# Patient Record
Sex: FEMALE | Race: WHITE | NOT HISPANIC OR LATINO | Employment: OTHER | URBAN - METROPOLITAN AREA
[De-identification: names, ages, dates, MRNs, and addresses within clinical notes are randomized per-mention and may not be internally consistent; named-entity substitution may affect disease eponyms.]

---

## 2017-01-21 ENCOUNTER — OFFICE VISIT (OUTPATIENT)
Dept: FAMILY MEDICINE CLINIC | Facility: CLINIC | Age: 62
End: 2017-01-21

## 2017-01-21 VITALS
DIASTOLIC BLOOD PRESSURE: 62 MMHG | HEIGHT: 65 IN | WEIGHT: 143 LBS | OXYGEN SATURATION: 94 % | BODY MASS INDEX: 23.82 KG/M2 | SYSTOLIC BLOOD PRESSURE: 112 MMHG | HEART RATE: 89 BPM | TEMPERATURE: 98.4 F

## 2017-01-21 DIAGNOSIS — J01.80 OTHER ACUTE SINUSITIS, RECURRENCE NOT SPECIFIED: ICD-10-CM

## 2017-01-21 DIAGNOSIS — E11.9 TYPE 2 DIABETES MELLITUS WITHOUT COMPLICATION, WITHOUT LONG-TERM CURRENT USE OF INSULIN (HCC): Primary | ICD-10-CM

## 2017-01-21 DIAGNOSIS — Z13.9 SCREENING: ICD-10-CM

## 2017-01-21 DIAGNOSIS — F32.A DEPRESSION, UNSPECIFIED DEPRESSION TYPE: ICD-10-CM

## 2017-01-21 DIAGNOSIS — I10 ESSENTIAL HYPERTENSION: ICD-10-CM

## 2017-01-21 LAB
ALBUMIN SERPL-MCNC: 4.8 G/DL (ref 3.5–5.2)
ALBUMIN UR-MCNC: 20 MG/L (ref 0–20)
ALBUMIN/GLOB SERPL: 1.7 G/DL
ALP SERPL-CCNC: 83 U/L (ref 39–117)
ALT SERPL W P-5'-P-CCNC: 19 U/L (ref 1–33)
ANION GAP SERPL CALCULATED.3IONS-SCNC: 13.3 MMOL/L
AST SERPL-CCNC: 21 U/L (ref 1–32)
BILIRUB SERPL-MCNC: 0.3 MG/DL (ref 0.1–1.2)
BUN BLD-MCNC: 23 MG/DL (ref 6–20)
BUN/CREAT SERPL: 16.4 (ref 7–25)
CALCIUM SPEC-SCNC: 10.2 MG/DL (ref 8.6–10.5)
CHLORIDE SERPL-SCNC: 101 MMOL/L (ref 98–107)
CHOLEST SERPL-MCNC: 240 MG/DL (ref 0–200)
CO2 SERPL-SCNC: 25.7 MMOL/L (ref 22–29)
CREAT BLD-MCNC: 1.4 MG/DL (ref 0.57–1)
GFR SERPL CREATININE-BSD FRML MDRD: 38 ML/MIN/1.73
GLOBULIN UR ELPH-MCNC: 2.8 GM/DL
GLUCOSE BLD-MCNC: 122 MG/DL (ref 65–99)
HBA1C MFR BLD: 6.3 % (ref 4.8–5.6)
HCV AB SER DONR QL: NORMAL
HDLC SERPL-MCNC: 47 MG/DL (ref 40–60)
LDLC SERPL CALC-MCNC: 156 MG/DL (ref 0–100)
LDLC/HDLC SERPL: 3.32 {RATIO}
POTASSIUM BLD-SCNC: 4.7 MMOL/L (ref 3.5–5.2)
PROT SERPL-MCNC: 7.6 G/DL (ref 6–8.5)
SODIUM BLD-SCNC: 140 MMOL/L (ref 136–145)
TRIGL SERPL-MCNC: 184 MG/DL (ref 0–150)
VLDLC SERPL-MCNC: 36.8 MG/DL (ref 5–40)

## 2017-01-21 PROCEDURE — 99214 OFFICE O/P EST MOD 30 MIN: CPT | Performed by: FAMILY MEDICINE

## 2017-01-21 PROCEDURE — 83036 HEMOGLOBIN GLYCOSYLATED A1C: CPT | Performed by: FAMILY MEDICINE

## 2017-01-21 PROCEDURE — 80053 COMPREHEN METABOLIC PANEL: CPT | Performed by: FAMILY MEDICINE

## 2017-01-21 PROCEDURE — 82043 UR ALBUMIN QUANTITATIVE: CPT | Performed by: FAMILY MEDICINE

## 2017-01-21 PROCEDURE — 80061 LIPID PANEL: CPT | Performed by: FAMILY MEDICINE

## 2017-01-21 PROCEDURE — 86803 HEPATITIS C AB TEST: CPT | Performed by: FAMILY MEDICINE

## 2017-01-21 RX ORDER — SPIRONOLACTONE 100 MG/1
100 TABLET, FILM COATED ORAL DAILY
COMMUNITY
End: 2022-02-23 | Stop reason: SDUPTHER

## 2017-01-21 RX ORDER — AMOXICILLIN 875 MG/1
875 TABLET, COATED ORAL 2 TIMES DAILY
Qty: 20 TABLET | Refills: 0 | Status: SHIPPED | OUTPATIENT
Start: 2017-01-21 | End: 2021-04-07

## 2017-01-21 NOTE — PROGRESS NOTES
SUBJECTIVE:  The patient is a 61-year-old white female is here for follow-up.  She's overdue on her visit.  She states her blood sugars are running well.  She has a history of diabetes renal insufficiency depression.  She's had some nasal congestion and a painful right ear.  No fever or chills.    PAST MEDICAL HISTORY:  Reviewed.    REVIEW OF SYSTEMS:  Please see above; 14 point ROS otherwise negative.      OBJECTIVE: Vitals signs are reviewed and are stable.    HEENT: PERRLA.   Right tympanic membrane is dull.  Left TM looks okay.  Tenderness is present over frontal sinuses.  Neck:  Supple.    Lungs:  Clear.    Heart:  Regular rate and rhythm.    Abdomen:   Soft, nontender.    Extremities:  No cyanosis, clubbing or edema.      ASSESSMENT:     Type 2 diabetes  Renal insufficiency  Hypertension  Sinusitis  History of depression    PLAN:   CMP fasting lipid hemoglobin A1c urine for microalbuminuria.  Amoxicillin 875 twice a day.  Diet and exercise discussed.  See ophthalmologist for eye exam.  Recheck 2 days if no better.    Much of this encounter note is an electronic transcription/translation of spoken language to printed text.  The electronic translation of spoken language may permit erroneous, or at times, nonsensical words or phrases to be inadvertently transcribed.  Although I have reviewed the note for such errors, some may still exist.

## 2017-04-03 RX ORDER — SITAGLIPTIN 100 MG/1
TABLET, FILM COATED ORAL
Qty: 90 TABLET | Refills: 0 | Status: SHIPPED | OUTPATIENT
Start: 2017-04-03 | End: 2017-07-01 | Stop reason: SDUPTHER

## 2017-07-02 RX ORDER — SITAGLIPTIN 100 MG/1
TABLET, FILM COATED ORAL
Qty: 90 TABLET | Refills: 0 | Status: SHIPPED | OUTPATIENT
Start: 2017-07-02 | End: 2017-09-28 | Stop reason: SDUPTHER

## 2017-09-11 RX ORDER — OLMESARTAN MEDOXOMIL AND HYDROCHLOROTHIAZIDE 40/12.5 40; 12.5 MG/1; MG/1
TABLET ORAL
Qty: 90 TABLET | Refills: 0 | Status: SHIPPED | OUTPATIENT
Start: 2017-09-11 | End: 2017-10-11 | Stop reason: SDUPTHER

## 2017-09-28 RX ORDER — SITAGLIPTIN 100 MG/1
TABLET, FILM COATED ORAL
Qty: 90 TABLET | Refills: 0 | Status: SHIPPED | OUTPATIENT
Start: 2017-09-28 | End: 2017-12-25 | Stop reason: SDUPTHER

## 2017-10-11 ENCOUNTER — TELEPHONE (OUTPATIENT)
Dept: FAMILY MEDICINE CLINIC | Facility: CLINIC | Age: 62
End: 2017-10-11

## 2017-10-11 RX ORDER — OLMESARTAN MEDOXOMIL AND HYDROCHLOROTHIAZIDE 40/12.5 40; 12.5 MG/1; MG/1
1 TABLET ORAL DAILY
Qty: 90 TABLET | Refills: 0 | Status: SHIPPED | OUTPATIENT
Start: 2017-10-11 | End: 2017-11-08

## 2017-10-11 NOTE — TELEPHONE ENCOUNTER
PT NEEDS A WRITTEN SCRIPT FOR BENICAR HCT 40-12.5MG TABLETS SAYING NOT TO GIVE GENERIC.  SAYS TO JUST HAVE DR. DUBOIS BRING IT HOME, IS NEXT DOOR NEIGHBOR.

## 2017-10-12 ENCOUNTER — TELEPHONE (OUTPATIENT)
Dept: FAMILY MEDICINE CLINIC | Facility: CLINIC | Age: 62
End: 2017-10-12

## 2017-10-12 NOTE — TELEPHONE ENCOUNTER
PT NEEDS HER BENICAR SENT OT PHARMACY WITH THE [facility] ON IT SO SHE CAN GET IT FILLED AND DOESN'T HAVE TO PAY SO MUCH ON HER COPAY    [place]

## 2017-11-08 ENCOUNTER — TELEPHONE (OUTPATIENT)
Dept: FAMILY MEDICINE CLINIC | Facility: CLINIC | Age: 62
End: 2017-11-08

## 2017-11-08 DIAGNOSIS — I10 HYPERTENSION, UNSPECIFIED TYPE: Primary | ICD-10-CM

## 2017-11-08 RX ORDER — LOSARTAN POTASSIUM AND HYDROCHLOROTHIAZIDE 25; 100 MG/1; MG/1
1 TABLET ORAL DAILY
Qty: 30 TABLET | Refills: 1 | Status: SHIPPED | OUTPATIENT
Start: 2017-11-08 | End: 2017-11-08 | Stop reason: SDUPTHER

## 2017-11-08 RX ORDER — LOSARTAN POTASSIUM AND HYDROCHLOROTHIAZIDE 25; 100 MG/1; MG/1
TABLET ORAL
Qty: 90 TABLET | Refills: 1 | Status: SHIPPED | OUTPATIENT
Start: 2017-11-08 | End: 2018-04-03 | Stop reason: SINTOL

## 2017-11-08 NOTE — TELEPHONE ENCOUNTER
Med changed to Losartan since she had never tried anything else, order given for b/p machine to monitor b/p at home and patient advised to schedule apt.

## 2017-11-10 RX ORDER — OLMESARTAN/HYDROCHLOROTHIAZIDE 40-12.5 MG
TABLET ORAL
Qty: 90 TABLET | Refills: 0 | Status: SHIPPED | OUTPATIENT
Start: 2017-11-10 | End: 2021-04-07

## 2017-12-26 RX ORDER — SITAGLIPTIN 100 MG/1
TABLET, FILM COATED ORAL
Qty: 90 TABLET | Refills: 0 | Status: SHIPPED | OUTPATIENT
Start: 2017-12-26 | End: 2018-03-24 | Stop reason: SDUPTHER

## 2018-03-26 RX ORDER — SITAGLIPTIN 100 MG/1
TABLET, FILM COATED ORAL
Qty: 90 TABLET | Refills: 0 | Status: SHIPPED | OUTPATIENT
Start: 2018-03-26 | End: 2019-05-23 | Stop reason: SDUPTHER

## 2018-04-03 ENCOUNTER — RESULTS ENCOUNTER (OUTPATIENT)
Dept: FAMILY MEDICINE CLINIC | Facility: CLINIC | Age: 63
End: 2018-04-03

## 2018-04-03 ENCOUNTER — OFFICE VISIT (OUTPATIENT)
Dept: FAMILY MEDICINE CLINIC | Facility: CLINIC | Age: 63
End: 2018-04-03

## 2018-04-03 VITALS
HEART RATE: 92 BPM | DIASTOLIC BLOOD PRESSURE: 70 MMHG | RESPIRATION RATE: 18 BRPM | BODY MASS INDEX: 23.66 KG/M2 | WEIGHT: 142 LBS | HEIGHT: 65 IN | OXYGEN SATURATION: 97 % | SYSTOLIC BLOOD PRESSURE: 110 MMHG | TEMPERATURE: 97.7 F

## 2018-04-03 DIAGNOSIS — I10 ESSENTIAL HYPERTENSION: ICD-10-CM

## 2018-04-03 DIAGNOSIS — Z12.11 SCREENING FOR COLON CANCER: Primary | ICD-10-CM

## 2018-04-03 DIAGNOSIS — E11.9 TYPE 2 DIABETES MELLITUS WITHOUT COMPLICATION, WITHOUT LONG-TERM CURRENT USE OF INSULIN (HCC): ICD-10-CM

## 2018-04-03 DIAGNOSIS — F32.A DEPRESSION, UNSPECIFIED DEPRESSION TYPE: ICD-10-CM

## 2018-04-03 DIAGNOSIS — E78.5 HYPERLIPIDEMIA, UNSPECIFIED HYPERLIPIDEMIA TYPE: ICD-10-CM

## 2018-04-03 DIAGNOSIS — Z12.11 SCREENING FOR COLON CANCER: ICD-10-CM

## 2018-04-03 LAB — 25(OH)D3 SERPL-MCNC: 85.3 NG/ML (ref 30–100)

## 2018-04-03 PROCEDURE — 80061 LIPID PANEL: CPT | Performed by: FAMILY MEDICINE

## 2018-04-03 PROCEDURE — 99214 OFFICE O/P EST MOD 30 MIN: CPT | Performed by: FAMILY MEDICINE

## 2018-04-03 PROCEDURE — 83036 HEMOGLOBIN GLYCOSYLATED A1C: CPT | Performed by: FAMILY MEDICINE

## 2018-04-03 PROCEDURE — 80053 COMPREHEN METABOLIC PANEL: CPT | Performed by: FAMILY MEDICINE

## 2018-04-03 PROCEDURE — 82306 VITAMIN D 25 HYDROXY: CPT | Performed by: FAMILY MEDICINE

## 2018-04-03 NOTE — PROGRESS NOTES
SUBJECTIVE:  The patient is a 62-year-old white female is here for follow-up.  She is past due on routine follow-up for diabetes.  She states she's not been checking her blood sugars.  She does not want colonoscopy but will do the cologuard testing.  Physically she feels okay.    PAST MEDICAL HISTORY:  Reviewed.    REVIEW OF SYSTEMS:  Please see above; 14 point ROS otherwise negative.      OBJECTIVE: Vitals signs are reviewed and are stable.    HEENT: PERRLA.    Neck:  Supple.    Lungs:  Clear.    Heart:  Regular rate and rhythm.    Abdomen:   Soft, nontender.    Extremities:  No cyanosis, clubbing or edema.  Neurovascular status intact    ASSESSMENT:    Type 2 diabetes  Hypertension  Hyperlipidemia  Depression    PLAN:  I stressed the importance of more routine follow-up.  I recommended a GYN breast exam and mammogram.  I recommend an eye exam.  Monitor blood sugars.  CMP fasting lipid hemoglobin A1c and urine for microalbuminuria ordered.    Much of this encounter note is an electronic transcription/translation of spoken language to printed text.  The electronic translation of spoken language may permit erroneous, or at times, nonsensical words or phrases to be inadvertently transcribed.  Although I have reviewed the note for such errors, some may still exist.

## 2018-04-09 RX ORDER — OLMESARTAN/HYDROCHLOROTHIAZIDE 40-12.5 MG
TABLET ORAL
Qty: 90 TABLET | Refills: 0 | Status: SHIPPED | OUTPATIENT
Start: 2018-04-09 | End: 2019-02-11 | Stop reason: SDUPTHER

## 2018-06-20 RX ORDER — SITAGLIPTIN 100 MG/1
TABLET, FILM COATED ORAL
Qty: 90 TABLET | Refills: 0 | Status: SHIPPED | OUTPATIENT
Start: 2018-06-20 | End: 2018-09-12 | Stop reason: SDUPTHER

## 2018-07-09 RX ORDER — OLMESARTAN/HYDROCHLOROTHIAZIDE 40-12.5 MG
TABLET ORAL
Qty: 90 TABLET | Refills: 0 | Status: SHIPPED | OUTPATIENT
Start: 2018-07-09 | End: 2019-02-11 | Stop reason: SDUPTHER

## 2018-09-12 RX ORDER — SITAGLIPTIN 100 MG/1
TABLET, FILM COATED ORAL
Qty: 90 TABLET | Refills: 0 | Status: SHIPPED | OUTPATIENT
Start: 2018-09-12 | End: 2018-12-08 | Stop reason: SDUPTHER

## 2018-10-07 RX ORDER — OLMESARTAN/HYDROCHLOROTHIAZIDE 40-12.5 MG
TABLET ORAL
Qty: 90 TABLET | Refills: 0 | Status: SHIPPED | OUTPATIENT
Start: 2018-10-07 | End: 2019-02-11 | Stop reason: SDUPTHER

## 2018-12-08 RX ORDER — SITAGLIPTIN 100 MG/1
TABLET, FILM COATED ORAL
Qty: 90 TABLET | Refills: 0 | Status: SHIPPED | OUTPATIENT
Start: 2018-12-08 | End: 2019-02-11 | Stop reason: SDUPTHER

## 2019-01-04 LAB — SCAN RESULT: NORMAL

## 2019-01-04 RX ORDER — OLMESARTAN/HYDROCHLOROTHIAZIDE 40-12.5 MG
TABLET ORAL
Qty: 90 TABLET | Refills: 0 | Status: SHIPPED | OUTPATIENT
Start: 2019-01-04 | End: 2019-02-11 | Stop reason: SDUPTHER

## 2019-01-04 RX ORDER — VENLAFAXINE HYDROCHLORIDE 150 MG/1
150 CAPSULE, EXTENDED RELEASE ORAL DAILY
Qty: 90 CAPSULE | Refills: 3 | Status: SHIPPED | OUTPATIENT
Start: 2019-01-04 | End: 2019-06-12 | Stop reason: SDUPTHER

## 2019-01-09 ENCOUNTER — TELEPHONE (OUTPATIENT)
Dept: FAMILY MEDICINE CLINIC | Facility: CLINIC | Age: 64
End: 2019-01-09

## 2019-01-14 ENCOUNTER — TELEPHONE (OUTPATIENT)
Dept: FAMILY MEDICINE CLINIC | Facility: CLINIC | Age: 64
End: 2019-01-14

## 2019-01-14 NOTE — TELEPHONE ENCOUNTER
CALLED Friday AND IS CALLING AGAIN TO GET A PA on HER BENICAR HCT 40/12.5 MG AND NEEDS THAT DONE SO SHE CAN GET HER MEDICATION AS SHE ONLY HAS ONE PILL LEFT SHE IS DESPERATE AND REALLY NEEDS HER MEDICATION TODAY. PLEASE SHE SAID, SHE SAID SHE IS ALSO DR DUBOIS NEIGHBOR.

## 2019-02-11 ENCOUNTER — OFFICE VISIT (OUTPATIENT)
Dept: FAMILY MEDICINE CLINIC | Facility: CLINIC | Age: 64
End: 2019-02-11

## 2019-02-11 VITALS
TEMPERATURE: 99.2 F | HEIGHT: 65 IN | OXYGEN SATURATION: 98 % | BODY MASS INDEX: 23.49 KG/M2 | HEART RATE: 91 BPM | SYSTOLIC BLOOD PRESSURE: 90 MMHG | WEIGHT: 141 LBS | DIASTOLIC BLOOD PRESSURE: 64 MMHG

## 2019-02-11 DIAGNOSIS — E78.2 MIXED HYPERLIPIDEMIA: ICD-10-CM

## 2019-02-11 DIAGNOSIS — F32.A DEPRESSION, UNSPECIFIED DEPRESSION TYPE: ICD-10-CM

## 2019-02-11 DIAGNOSIS — I10 ESSENTIAL HYPERTENSION: ICD-10-CM

## 2019-02-11 DIAGNOSIS — F41.9 ANXIETY: ICD-10-CM

## 2019-02-11 DIAGNOSIS — E11.9 TYPE 2 DIABETES MELLITUS WITHOUT COMPLICATION, WITHOUT LONG-TERM CURRENT USE OF INSULIN (HCC): Primary | ICD-10-CM

## 2019-02-11 LAB
ALBUMIN SERPL-MCNC: 4.7 G/DL (ref 3.5–5.2)
ALBUMIN UR-MCNC: 20 MG/L (ref 0–20)
ALBUMIN/GLOB SERPL: 1.7 G/DL
ALP SERPL-CCNC: 76 U/L (ref 39–117)
ALT SERPL W P-5'-P-CCNC: 26 U/L (ref 1–33)
ANION GAP SERPL CALCULATED.3IONS-SCNC: 13.6 MMOL/L
AST SERPL-CCNC: 15 U/L (ref 1–32)
BILIRUB SERPL-MCNC: 0.3 MG/DL (ref 0.1–1.2)
BUN BLD-MCNC: 24 MG/DL (ref 8–23)
BUN/CREAT SERPL: 19.5 (ref 7–25)
CALCIUM SPEC-SCNC: 10.6 MG/DL (ref 8.6–10.5)
CHLORIDE SERPL-SCNC: 101 MMOL/L (ref 98–107)
CHOLEST SERPL-MCNC: 242 MG/DL (ref 0–200)
CO2 SERPL-SCNC: 26.4 MMOL/L (ref 22–29)
CREAT BLD-MCNC: 1.23 MG/DL (ref 0.57–1)
GFR SERPL CREATININE-BSD FRML MDRD: 44 ML/MIN/1.73
GLOBULIN UR ELPH-MCNC: 2.7 GM/DL
GLUCOSE BLD-MCNC: 143 MG/DL (ref 65–99)
HBA1C MFR BLD: 7.1 % (ref 4.8–5.6)
HDLC SERPL-MCNC: 44 MG/DL (ref 40–60)
LDLC SERPL CALC-MCNC: 152 MG/DL (ref 0–100)
LDLC/HDLC SERPL: 3.45 {RATIO}
POTASSIUM BLD-SCNC: 4.8 MMOL/L (ref 3.5–5.2)
PROT SERPL-MCNC: 7.4 G/DL (ref 6–8.5)
SODIUM BLD-SCNC: 141 MMOL/L (ref 136–145)
TRIGL SERPL-MCNC: 230 MG/DL (ref 0–150)
VLDLC SERPL-MCNC: 46 MG/DL (ref 5–40)

## 2019-02-11 PROCEDURE — 80053 COMPREHEN METABOLIC PANEL: CPT | Performed by: FAMILY MEDICINE

## 2019-02-11 PROCEDURE — 36415 COLL VENOUS BLD VENIPUNCTURE: CPT | Performed by: FAMILY MEDICINE

## 2019-02-11 PROCEDURE — 82043 UR ALBUMIN QUANTITATIVE: CPT | Performed by: FAMILY MEDICINE

## 2019-02-11 PROCEDURE — 80061 LIPID PANEL: CPT | Performed by: FAMILY MEDICINE

## 2019-02-11 PROCEDURE — 99214 OFFICE O/P EST MOD 30 MIN: CPT | Performed by: FAMILY MEDICINE

## 2019-02-11 PROCEDURE — 83036 HEMOGLOBIN GLYCOSYLATED A1C: CPT | Performed by: FAMILY MEDICINE

## 2019-02-11 NOTE — PROGRESS NOTES
SUBJECTIVE:  The patient is a 63-year-old white female comes in for follow-up regarding her diabetes.  She is long overdue on labs.  Generally she does not check her blood sugars.  She feels pretty good.  No physical complaints.    PAST MEDICAL HISTORY:  Reviewed.    REVIEW OF SYSTEMS:  Please see above; 14 point ROS negative.      OBJECTIVE: Vitals signs are reviewed and are stable.    HEENT: PERRLA.   Neck:  Supple.   Lungs:  Clear.    Heart:  Regular rate and rhythm.   Abdomen:   Soft, nontender.   Extremities:  No cyanosis, clubbing or edema.     ASSESSMENT:      PLAN: Patient is advised she needs a breast exam and mammogram.  She states she will schedule it.  She is advised to be more compliant on checking her blood sugars.  CMP fasting lipid hemoglobin A1c urine for microalbuminuria ordered.  She will follow up on her labs.  Healthy lifestyle was discussed.  She will call if problems.  She is advised she needs a diabetic eye exam.    Dictated utilizing Dragon dictation.

## 2019-03-01 RX ORDER — SITAGLIPTIN 100 MG/1
TABLET, FILM COATED ORAL
Qty: 90 TABLET | Refills: 0 | Status: SHIPPED | OUTPATIENT
Start: 2019-03-01 | End: 2019-06-12 | Stop reason: SDUPTHER

## 2019-04-16 RX ORDER — OLMESARTAN/HYDROCHLOROTHIAZIDE 40-12.5 MG
TABLET ORAL
Qty: 90 TABLET | Refills: 0 | Status: SHIPPED | OUTPATIENT
Start: 2019-04-16 | End: 2019-06-24 | Stop reason: SDUPTHER

## 2019-04-17 ENCOUNTER — TELEPHONE (OUTPATIENT)
Dept: FAMILY MEDICINE CLINIC | Facility: CLINIC | Age: 64
End: 2019-04-17

## 2019-04-17 NOTE — TELEPHONE ENCOUNTER
PT SAYS [facility] HASN'T HEARD BACK IN REGARDS TO HER RX BENICAR HCT. SAYS SHE WILL BE OUT OF THE RX AFTER TODAY

## 2019-04-17 NOTE — TELEPHONE ENCOUNTER
Please call this in for her.  No generic.  This happens every year.  It eventually gets overridden.

## 2019-05-23 ENCOUNTER — TELEPHONE (OUTPATIENT)
Dept: FAMILY MEDICINE CLINIC | Facility: CLINIC | Age: 64
End: 2019-05-23

## 2019-05-23 NOTE — TELEPHONE ENCOUNTER
Called and spoke with pt, informed her she needs to come in before anymore refills because she is over due an appt for diabetes. She made an appt but said she is watching her grandson so if she can't make it she will reschedule. I told her this is the last refill so make sure she gets here before next refill.

## 2019-06-11 ENCOUNTER — OFFICE VISIT (OUTPATIENT)
Dept: FAMILY MEDICINE CLINIC | Facility: CLINIC | Age: 64
End: 2019-06-11

## 2019-06-11 VITALS
DIASTOLIC BLOOD PRESSURE: 56 MMHG | WEIGHT: 140 LBS | OXYGEN SATURATION: 98 % | TEMPERATURE: 98.5 F | BODY MASS INDEX: 23.32 KG/M2 | HEIGHT: 65 IN | SYSTOLIC BLOOD PRESSURE: 80 MMHG | HEART RATE: 92 BPM

## 2019-06-11 DIAGNOSIS — L98.9 SKIN LESION: ICD-10-CM

## 2019-06-11 DIAGNOSIS — I10 ESSENTIAL HYPERTENSION: ICD-10-CM

## 2019-06-11 DIAGNOSIS — H92.03 ACUTE OTALGIA, BILATERAL: ICD-10-CM

## 2019-06-11 DIAGNOSIS — E78.2 MIXED HYPERLIPIDEMIA: ICD-10-CM

## 2019-06-11 DIAGNOSIS — E11.9 TYPE 2 DIABETES MELLITUS WITHOUT COMPLICATION, WITHOUT LONG-TERM CURRENT USE OF INSULIN (HCC): Primary | ICD-10-CM

## 2019-06-11 LAB
ALBUMIN SERPL-MCNC: 4.7 G/DL (ref 3.5–5.2)
ALBUMIN/GLOB SERPL: 1.9 G/DL
ALP SERPL-CCNC: 90 U/L (ref 39–117)
ALT SERPL W P-5'-P-CCNC: 20 U/L (ref 1–33)
ANION GAP SERPL CALCULATED.3IONS-SCNC: 13.4 MMOL/L
AST SERPL-CCNC: 17 U/L (ref 1–32)
BILIRUB SERPL-MCNC: 0.4 MG/DL (ref 0.2–1.2)
BUN BLD-MCNC: 26 MG/DL (ref 8–23)
BUN/CREAT SERPL: 17.3 (ref 7–25)
CALCIUM SPEC-SCNC: 10.2 MG/DL (ref 8.6–10.5)
CHLORIDE SERPL-SCNC: 98 MMOL/L (ref 98–107)
CHOLEST SERPL-MCNC: 247 MG/DL (ref 0–200)
CO2 SERPL-SCNC: 23.6 MMOL/L (ref 22–29)
CREAT BLD-MCNC: 1.5 MG/DL (ref 0.57–1)
GFR SERPL CREATININE-BSD FRML MDRD: 35 ML/MIN/1.73
GLOBULIN UR ELPH-MCNC: 2.5 GM/DL
GLUCOSE BLD-MCNC: 141 MG/DL (ref 65–99)
HBA1C MFR BLD: 7.3 % (ref 4.8–5.6)
HDLC SERPL-MCNC: 43 MG/DL (ref 40–60)
LDLC SERPL CALC-MCNC: 158 MG/DL (ref 0–100)
LDLC/HDLC SERPL: 3.67 {RATIO}
POTASSIUM BLD-SCNC: 5 MMOL/L (ref 3.5–5.2)
PROT SERPL-MCNC: 7.2 G/DL (ref 6–8.5)
SODIUM BLD-SCNC: 135 MMOL/L (ref 136–145)
TRIGL SERPL-MCNC: 231 MG/DL (ref 0–150)
VLDLC SERPL-MCNC: 46.2 MG/DL (ref 5–40)

## 2019-06-11 PROCEDURE — 36415 COLL VENOUS BLD VENIPUNCTURE: CPT | Performed by: FAMILY MEDICINE

## 2019-06-11 PROCEDURE — 99214 OFFICE O/P EST MOD 30 MIN: CPT | Performed by: FAMILY MEDICINE

## 2019-06-11 PROCEDURE — 80061 LIPID PANEL: CPT | Performed by: FAMILY MEDICINE

## 2019-06-11 PROCEDURE — 83036 HEMOGLOBIN GLYCOSYLATED A1C: CPT | Performed by: FAMILY MEDICINE

## 2019-06-11 PROCEDURE — 80053 COMPREHEN METABOLIC PANEL: CPT | Performed by: FAMILY MEDICINE

## 2019-06-11 RX ORDER — CEFUROXIME AXETIL 250 MG/1
250 TABLET ORAL 2 TIMES DAILY
Qty: 20 TABLET | Refills: 0 | Status: SHIPPED | OUTPATIENT
Start: 2019-06-11 | End: 2019-06-13 | Stop reason: ALTCHOICE

## 2019-06-11 RX ORDER — OLMESARTAN MEDOXOMIL 20 MG/1
20 TABLET, FILM COATED ORAL DAILY
Qty: 90 TABLET | Refills: 3 | Status: SHIPPED | OUTPATIENT
Start: 2019-06-11 | End: 2020-07-08

## 2019-06-11 RX ORDER — OLMESARTAN MEDOXOMIL 20 MG/1
20 TABLET ORAL DAILY
Qty: 90 TABLET | Refills: 3 | Status: SHIPPED | OUTPATIENT
Start: 2019-06-11 | End: 2019-06-11 | Stop reason: SDUPTHER

## 2019-06-11 NOTE — PROGRESS NOTES
SUBJECTIVE:  The patient is a 64-year-old white female here for follow-up.  She has history of hypertension hyperlipidemia and depression.  She has a history of diabetes.  She is due lab work.  She complains of earache on the left and right.  She has not checked her blood sugar in a while.  She has lost a few pounds since her last visit.  She complains of some arthralgias that improve throughout the day.  She has a lesion on her left wrist.  Otherwise she feels good.    PAST MEDICAL HISTORY:  Reviewed.    REVIEW OF SYSTEMS:  Please see above; all others reviewed and are negative.      OBJECTIVE:   Vitals signs are reviewed and are stable.    General:  Well-nourished.  Alert and oriented x3 in no acute distress.  HEENT: PERRLA.  TMs are dull with fluid behind the membranes.  Neck:  Supple.  No masses.  Lungs:  Clear.    Heart:  Regular rate and rhythm.   Abdomen:   Soft, nontender.   Extremities:  No cyanosis, clubbing or edema.  5 mm scaly lesion present on the dorsal surface of the left wrist.  Neurological:  Grossly intact without motor or sensory deficits.     ASSESSMENT:    Type 2 diabetes  Hypertension with low blood pressure reading today-asymptomatic  Bilateral otitis media  Skin lesion  PLAN: On today's blood pressure reading was 80/56.  Hemoglobin the patient is asymptomatic.  I decreased her Benicar from 40/12 0.5 to 20 mg daily.  She will monitor blood pressure.  Ceftin 250 twice daily for years.  She will see a dermatologist regarding the lesion on her left wrist.  CBC CMP fasting lipids hemoglobin A1c ordered.  The patient is advised to see her gynecologist for breast exam and mammogram.  Urged compliance regarding GYN exam.  Urged compliance on monitoring blood sugar.  Healthy lifestyle discussed.  Patient will follow-up on labs.  She will call if any problems.  She will monitor her blood pressure on the new dose of Benicar.    Dictated utilizing Dragon dictation.

## 2019-06-12 RX ORDER — VENLAFAXINE HYDROCHLORIDE 150 MG/1
150 CAPSULE, EXTENDED RELEASE ORAL DAILY
Qty: 90 CAPSULE | Refills: 3 | Status: SHIPPED | OUTPATIENT
Start: 2019-06-12 | End: 2019-07-01 | Stop reason: SDUPTHER

## 2019-06-13 ENCOUNTER — TELEPHONE (OUTPATIENT)
Dept: FAMILY MEDICINE CLINIC | Facility: CLINIC | Age: 64
End: 2019-06-13

## 2019-06-13 RX ORDER — AMOXICILLIN 875 MG/1
875 TABLET, COATED ORAL 2 TIMES DAILY
Qty: 20 TABLET | Refills: 0 | Status: SHIPPED | OUTPATIENT
Start: 2019-06-13 | End: 2021-04-07

## 2019-06-13 NOTE — TELEPHONE ENCOUNTER
PT WAS PRESCRIBED AN ABX FOR HER EAR, SAYS IT DID NOT AGREE WITH HER. WANTS TO KNOW IF DR. DUBOIS COULD POSSIBLY SEND IN SOMETHING DIFFERENT?

## 2019-06-20 RX ORDER — SITAGLIPTIN 100 MG/1
TABLET, FILM COATED ORAL
Qty: 30 TABLET | Refills: 0 | Status: SHIPPED | OUTPATIENT
Start: 2019-06-20 | End: 2019-09-16 | Stop reason: SDUPTHER

## 2019-06-24 ENCOUNTER — TELEPHONE (OUTPATIENT)
Dept: FAMILY MEDICINE CLINIC | Facility: CLINIC | Age: 64
End: 2019-06-24

## 2019-06-24 RX ORDER — OLMESARTAN/HYDROCHLOROTHIAZIDE 40-12.5 MG
1 TABLET ORAL DAILY
Qty: 90 TABLET | Refills: 3 | Status: SHIPPED | OUTPATIENT
Start: 2019-06-24 | End: 2019-09-16 | Stop reason: SDUPTHER

## 2019-07-01 RX ORDER — VENLAFAXINE HYDROCHLORIDE 150 MG/1
150 CAPSULE, EXTENDED RELEASE ORAL DAILY
Qty: 90 CAPSULE | Refills: 0 | Status: SHIPPED | OUTPATIENT
Start: 2019-07-01 | End: 2019-07-01 | Stop reason: SDUPTHER

## 2019-07-01 RX ORDER — VENLAFAXINE HYDROCHLORIDE 150 MG/1
150 CAPSULE, EXTENDED RELEASE ORAL DAILY
Qty: 90 CAPSULE | Refills: 3 | Status: SHIPPED | OUTPATIENT
Start: 2019-07-01 | End: 2021-08-25 | Stop reason: SDUPTHER

## 2019-07-03 ENCOUNTER — TELEPHONE (OUTPATIENT)
Dept: FAMILY MEDICINE CLINIC | Facility: CLINIC | Age: 64
End: 2019-07-03

## 2019-09-14 RX ORDER — SITAGLIPTIN 100 MG/1
TABLET, FILM COATED ORAL
Qty: 90 TABLET | Refills: 0 | Status: SHIPPED | OUTPATIENT
Start: 2019-09-14 | End: 2020-04-02

## 2019-09-16 ENCOUNTER — PRIOR AUTHORIZATION (OUTPATIENT)
Dept: FAMILY MEDICINE CLINIC | Facility: CLINIC | Age: 64
End: 2019-09-16

## 2019-09-16 ENCOUNTER — TELEPHONE (OUTPATIENT)
Dept: FAMILY MEDICINE CLINIC | Facility: CLINIC | Age: 64
End: 2019-09-16

## 2019-09-16 RX ORDER — OLMESARTAN/HYDROCHLOROTHIAZIDE 40-12.5 MG
1 TABLET ORAL DAILY
Qty: 90 TABLET | Refills: 3 | Status: SHIPPED | OUTPATIENT
Start: 2019-09-16 | End: 2020-10-11 | Stop reason: SDUPTHER

## 2019-09-16 RX ORDER — SITAGLIPTIN 100 MG/1
100 TABLET, FILM COATED ORAL DAILY
Qty: 30 TABLET | Refills: 3 | Status: SHIPPED | OUTPATIENT
Start: 2019-09-16 | End: 2020-04-02 | Stop reason: SDUPTHER

## 2019-09-16 NOTE — TELEPHONE ENCOUNTER
Sent in new rx's so pharmacy can send PA requests if needed to us. Pt is not sure if she needs a PA. Gave samples of Januvia.

## 2019-09-16 NOTE — TELEPHONE ENCOUNTER
PA for benicar, and effexor have been approved. Pharmacy was notified. Spoke with Mary.     PA approved for Rohituvia, pharmacy notified

## 2020-04-02 RX ORDER — SITAGLIPTIN 100 MG/1
TABLET, FILM COATED ORAL
Qty: 90 TABLET | Refills: 0 | Status: SHIPPED | OUTPATIENT
Start: 2020-04-02 | End: 2020-07-01

## 2020-07-01 RX ORDER — SITAGLIPTIN 100 MG/1
TABLET, FILM COATED ORAL
Qty: 30 TABLET | Refills: 0 | Status: SHIPPED | OUTPATIENT
Start: 2020-07-01 | End: 2020-08-03

## 2020-07-08 RX ORDER — OLMESARTAN MEDOXOMIL 20 MG/1
20 TABLET, FILM COATED ORAL DAILY
Qty: 90 TABLET | Refills: 3 | Status: SHIPPED | OUTPATIENT
Start: 2020-07-08 | End: 2021-06-30

## 2020-08-03 RX ORDER — SITAGLIPTIN 100 MG/1
TABLET, FILM COATED ORAL
Qty: 30 TABLET | Refills: 0 | Status: SHIPPED | OUTPATIENT
Start: 2020-08-03 | End: 2020-08-31

## 2020-08-31 RX ORDER — SITAGLIPTIN 100 MG/1
TABLET, FILM COATED ORAL
Qty: 30 TABLET | Refills: 0 | Status: SHIPPED | OUTPATIENT
Start: 2020-08-31 | End: 2020-10-05

## 2020-10-05 RX ORDER — SITAGLIPTIN 100 MG/1
TABLET, FILM COATED ORAL
Qty: 30 TABLET | Refills: 0 | Status: SHIPPED | OUTPATIENT
Start: 2020-10-05 | End: 2021-01-05 | Stop reason: SDUPTHER

## 2020-10-08 ENCOUNTER — TELEPHONE (OUTPATIENT)
Dept: FAMILY MEDICINE CLINIC | Facility: CLINIC | Age: 65
End: 2020-10-08

## 2020-10-08 NOTE — TELEPHONE ENCOUNTER
PA started for Benicar and Effexor, both sent for review. Ref # Benicar [id number] and Effexor XR [id number] phone number  [phone]

## 2020-10-11 RX ORDER — OLMESARTAN/HYDROCHLOROTHIAZIDE 40-12.5 MG
1 TABLET ORAL DAILY
Qty: 30 TABLET | Refills: 0 | Status: SHIPPED | OUTPATIENT
Start: 2020-10-11 | End: 2021-04-07

## 2020-10-12 ENCOUNTER — TELEPHONE (OUTPATIENT)
Dept: FAMILY MEDICINE CLINIC | Facility: CLINIC | Age: 65
End: 2020-10-12

## 2020-10-12 NOTE — TELEPHONE ENCOUNTER
PA approved for Benicar 20mg and EffexorXR 150 good through 12/31/2021, information faxed and called to [facility] [phone]

## 2020-11-02 ENCOUNTER — TELEPHONE (OUTPATIENT)
Dept: FAMILY MEDICINE CLINIC | Facility: CLINIC | Age: 65
End: 2020-11-02

## 2020-11-02 NOTE — TELEPHONE ENCOUNTER
They both need to see me.  Try to fit them in on Wednesday.  Make them my last available appointment.  Have them come earlier.  Have them fasting.

## 2020-11-02 NOTE — TELEPHONE ENCOUNTER
YUSUF AND MANUELITO DAN HAVE APPOINTMENTS FOR LAB WORK ON Wednesday 11/4 AT 10:00.    PLEASE ENTER ORDERS FOR LABS FOR BOTH PATIENTS.

## 2020-11-03 ENCOUNTER — TELEPHONE (OUTPATIENT)
Dept: FAMILY MEDICINE CLINIC | Facility: CLINIC | Age: 65
End: 2020-11-03

## 2020-11-04 ENCOUNTER — OFFICE VISIT (OUTPATIENT)
Dept: FAMILY MEDICINE CLINIC | Facility: CLINIC | Age: 65
End: 2020-11-04

## 2020-11-04 ENCOUNTER — TELEPHONE (OUTPATIENT)
Dept: FAMILY MEDICINE CLINIC | Facility: CLINIC | Age: 65
End: 2020-11-04

## 2020-11-04 VITALS
HEART RATE: 82 BPM | WEIGHT: 136 LBS | BODY MASS INDEX: 22.66 KG/M2 | TEMPERATURE: 97.1 F | OXYGEN SATURATION: 95 % | HEIGHT: 65 IN | DIASTOLIC BLOOD PRESSURE: 60 MMHG | SYSTOLIC BLOOD PRESSURE: 100 MMHG

## 2020-11-04 DIAGNOSIS — R39.9 URINARY TRACT INFECTION SYMPTOMS: ICD-10-CM

## 2020-11-04 DIAGNOSIS — E78.5 HYPERLIPIDEMIA, UNSPECIFIED HYPERLIPIDEMIA TYPE: ICD-10-CM

## 2020-11-04 DIAGNOSIS — I10 ESSENTIAL HYPERTENSION: ICD-10-CM

## 2020-11-04 DIAGNOSIS — E11.9 TYPE 2 DIABETES MELLITUS WITHOUT COMPLICATION, WITHOUT LONG-TERM CURRENT USE OF INSULIN (HCC): Primary | ICD-10-CM

## 2020-11-04 LAB
ALBUMIN SERPL-MCNC: 4.6 G/DL (ref 3.5–5.2)
ALBUMIN UR-MCNC: 20 MG/L (ref 0–20)
ALBUMIN/GLOB SERPL: 1.8 G/DL
ALP SERPL-CCNC: 109 U/L (ref 39–117)
ALT SERPL W P-5'-P-CCNC: 26 U/L (ref 1–33)
ANION GAP SERPL CALCULATED.3IONS-SCNC: 7.4 MMOL/L (ref 5–15)
AST SERPL-CCNC: 20 U/L (ref 1–32)
BACTERIA UR QL AUTO: ABNORMAL /HPF
BILIRUB SERPL-MCNC: 0.3 MG/DL (ref 0–1.2)
BILIRUB UR QL STRIP: ABNORMAL
BUN SERPL-MCNC: 15 MG/DL (ref 8–23)
BUN/CREAT SERPL: 16.5 (ref 7–25)
CALCIUM SPEC-SCNC: 10 MG/DL (ref 8.6–10.5)
CHLORIDE SERPL-SCNC: 98 MMOL/L (ref 98–107)
CHOLEST SERPL-MCNC: 230 MG/DL (ref 0–200)
CLARITY UR: CLEAR
CO2 SERPL-SCNC: 30.6 MMOL/L (ref 22–29)
COLOR UR: YELLOW
CREAT SERPL-MCNC: 0.91 MG/DL (ref 0.57–1)
ERYTHROCYTE [DISTWIDTH] IN BLOOD BY AUTOMATED COUNT: 12.3 % (ref 12.3–15.4)
GFR SERPL CREATININE-BSD FRML MDRD: 62 ML/MIN/1.73
GLOBULIN UR ELPH-MCNC: 2.5 GM/DL
GLUCOSE SERPL-MCNC: 296 MG/DL (ref 65–99)
GLUCOSE UR STRIP-MCNC: ABNORMAL MG/DL
HBA1C MFR BLD: 11.3 % (ref 4.8–5.6)
HCT VFR BLD AUTO: 42.2 % (ref 34–46.6)
HDLC SERPL-MCNC: 45 MG/DL (ref 40–60)
HGB BLD-MCNC: 13.9 G/DL (ref 12–15.9)
HGB UR QL STRIP.AUTO: ABNORMAL
KETONES UR QL STRIP: ABNORMAL
LDLC SERPL CALC-MCNC: 147 MG/DL (ref 0–100)
LDLC/HDLC SERPL: 3.19 {RATIO}
LEUKOCYTE ESTERASE UR QL STRIP.AUTO: ABNORMAL
LYMPHOCYTES # BLD AUTO: 1.8 10*3/MM3 (ref 0.7–3.1)
LYMPHOCYTES NFR BLD AUTO: 27.5 % (ref 19.6–45.3)
MCH RBC QN AUTO: 30.5 PG (ref 26.6–33)
MCHC RBC AUTO-ENTMCNC: 32.9 G/DL (ref 31.5–35.7)
MCV RBC AUTO: 92.8 FL (ref 79–97)
MONOCYTES # BLD AUTO: 0.5 10*3/MM3 (ref 0.1–0.9)
MONOCYTES NFR BLD AUTO: 8.5 % (ref 5–12)
NEUTROPHILS NFR BLD AUTO: 4.1 10*3/MM3 (ref 1.7–7)
NEUTROPHILS NFR BLD AUTO: 64 % (ref 42.7–76)
NITRITE UR QL STRIP: NEGATIVE
PH UR STRIP.AUTO: 6 [PH] (ref 4.6–8)
PLATELET # BLD AUTO: 360 10*3/MM3 (ref 140–450)
PMV BLD AUTO: 7.4 FL (ref 6–12)
POTASSIUM SERPL-SCNC: 4.5 MMOL/L (ref 3.5–5.2)
PROT SERPL-MCNC: 7.1 G/DL (ref 6–8.5)
PROT UR QL STRIP: ABNORMAL
RBC # BLD AUTO: 4.54 10*6/MM3 (ref 3.77–5.28)
RBC # UR: ABNORMAL /HPF
REF LAB TEST METHOD: ABNORMAL
SODIUM SERPL-SCNC: 136 MMOL/L (ref 136–145)
SP GR UR STRIP: >=1.03 (ref 1–1.03)
SQUAMOUS #/AREA URNS HPF: ABNORMAL /HPF
TRIGL SERPL-MCNC: 208 MG/DL (ref 0–150)
UROBILINOGEN UR QL STRIP: ABNORMAL
VLDLC SERPL-MCNC: 38 MG/DL (ref 5–40)
WBC # BLD AUTO: 6.4 10*3/MM3 (ref 3.4–10.8)
WBC UR QL AUTO: ABNORMAL /HPF

## 2020-11-04 PROCEDURE — 80053 COMPREHEN METABOLIC PANEL: CPT | Performed by: FAMILY MEDICINE

## 2020-11-04 PROCEDURE — 81001 URINALYSIS AUTO W/SCOPE: CPT | Performed by: FAMILY MEDICINE

## 2020-11-04 PROCEDURE — 83036 HEMOGLOBIN GLYCOSYLATED A1C: CPT | Performed by: FAMILY MEDICINE

## 2020-11-04 PROCEDURE — 80061 LIPID PANEL: CPT | Performed by: FAMILY MEDICINE

## 2020-11-04 PROCEDURE — 87086 URINE CULTURE/COLONY COUNT: CPT | Performed by: FAMILY MEDICINE

## 2020-11-04 PROCEDURE — 99214 OFFICE O/P EST MOD 30 MIN: CPT | Performed by: FAMILY MEDICINE

## 2020-11-04 PROCEDURE — 82043 UR ALBUMIN QUANTITATIVE: CPT | Performed by: FAMILY MEDICINE

## 2020-11-04 PROCEDURE — 36415 COLL VENOUS BLD VENIPUNCTURE: CPT | Performed by: FAMILY MEDICINE

## 2020-11-04 PROCEDURE — 85025 COMPLETE CBC W/AUTO DIFF WBC: CPT | Performed by: FAMILY MEDICINE

## 2020-11-04 RX ORDER — NITROFURANTOIN 25; 75 MG/1; MG/1
100 CAPSULE ORAL 2 TIMES DAILY
Qty: 14 CAPSULE | Refills: 0 | Status: SHIPPED | OUTPATIENT
Start: 2020-11-04 | End: 2021-04-07

## 2020-11-04 NOTE — TELEPHONE ENCOUNTER
Caller: Paz Perera    Relationship: Self    Best call back number: [phone]    What test was performed: UNRINALYSIS    When was the test performed: TODAY    Where was the test performed: [facility]    Additional notes: PLEASE CALL PT BACK TO DISCUSS RESULTS WHEN THEY ARE AVAILABLE

## 2020-11-04 NOTE — PROGRESS NOTES
"SUBJECTIVE:  The patient is a 65-year-old white female.  She has diabetes hypertension hyperlipidemia and monopolar depression.  She is overdue on lab work.  Today she complains of urinary frequency and malodorous urine.  No fever chills.  No chest pain or shortness of breath.    PAST MEDICAL HISTORY:  Reviewed.    REVIEW OF SYSTEMS:  Please see above.  All others reviewed and are negative.      OBJECTIVE:   /60 (BP Location: Left arm, Patient Position: Sitting, Cuff Size: Adult)   Pulse 82   Temp 97.1 °F (36.2 °C) (Infrared)   Ht 165.1 cm (65\")   Wt 61.7 kg (136 lb)   SpO2 95%   BMI 22.63 kg/m²    Vitals signs are reviewed and are stable.    General:  Well-nourished.  Alert and oriented x3 in no acute distress.  HEENT: PERRLA.   Neck:  Supple.   Lungs:  Clear.    Heart:  Regular rate and rhythm.   Abdomen:   Soft, nontender.   Extremities:  No cyanosis, clubbing or edema.   Neurological:  Grossly intact without motor or sensory deficits.     ASSESSMENT:      Diagnoses and all orders for this visit:    1. Type 2 diabetes mellitus without complication, without long-term current use of insulin (CMS/[facility]) (Primary)  -     CBC & Differential  -     Comprehensive Metabolic Panel  -     Hemoglobin A1c  -     Lipid Panel  -     Urinalysis With Microscopic - Urine, Clean Catch  -     Urine Culture - Urine, Urine, Clean Catch  -     MicroAlbumin, Urine, Random - Urine, Clean Catch    2. Hyperlipidemia, unspecified hyperlipidemia type  -     CBC & Differential  -     Comprehensive Metabolic Panel  -     Hemoglobin A1c  -     Lipid Panel  -     Urinalysis With Microscopic - Urine, Clean Catch  -     Urine Culture - Urine, Urine, Clean Catch  -     MicroAlbumin, Urine, Random - Urine, Clean Catch    3. Essential hypertension  -     CBC & Differential  -     Comprehensive Metabolic Panel  -     Hemoglobin A1c  -     Lipid Panel  -     Urinalysis With Microscopic - Urine, Clean Catch  -     Urine Culture - Urine, " Urine, Clean Catch  -     MicroAlbumin, Urine, Random - Urine, Clean Catch         PLAN: She declines GYN breast recommendation.  Recommended better adherence to medications and follow-up.  Healthy lifestyle discussed.  Follow-up on labs.  Call if problems.    Dictated utilizing Dragon dictation.

## 2020-11-05 LAB — BACTERIA SPEC AEROBE CULT: NORMAL

## 2020-11-05 RX ORDER — METFORMIN HYDROCHLORIDE 500 MG/1
500 TABLET, EXTENDED RELEASE ORAL
Qty: 90 TABLET | Refills: 3 | Status: SHIPPED | OUTPATIENT
Start: 2020-11-05 | End: 2021-01-14 | Stop reason: SDUPTHER

## 2020-11-05 NOTE — TELEPHONE ENCOUNTER
Caller: Paz Perera    Relationship: Self    Best call back number: [phone]    Medication needed:   Requested Prescriptions     Pending Prescriptions Disp Refills   • glucose blood (ONE TOUCH ULTRA TEST) test strip 100 each 12     Sig: E11.9 Test bs daily       When do you need the refill by: ASAP    What details did the patient provide when requesting the medication: PATIENT STATES THAT SHE HAS THE ONE TOUCH ULTRA 2     Does the patient have less than a 3 day supply:  [x] Yes  [] No    What is the patient's preferred pharmacy: [facility] #[id number] [place] [address] [address] AT [address] [facility] [phone] [facility] [phone] FX

## 2020-11-05 NOTE — TELEPHONE ENCOUNTER
You need to closely monitor your blood sugars and let me know what the running next week.  Leave message at office.

## 2020-11-09 RX ORDER — LANCETS 30 GAUGE
EACH MISCELLANEOUS
Qty: 100 EACH | Refills: 3 | Status: SHIPPED | OUTPATIENT
Start: 2020-11-09

## 2020-11-09 RX ORDER — BLOOD-GLUCOSE METER
EACH MISCELLANEOUS
Qty: 1 KIT | Refills: 1 | Status: SHIPPED | OUTPATIENT
Start: 2020-11-09

## 2020-11-13 RX ORDER — BLOOD-GLUCOSE METER
KIT MISCELLANEOUS
Qty: 1 EACH | Refills: 1 | Status: SHIPPED | OUTPATIENT
Start: 2020-11-13

## 2021-01-05 ENCOUNTER — TELEPHONE (OUTPATIENT)
Dept: FAMILY MEDICINE CLINIC | Facility: CLINIC | Age: 66
End: 2021-01-05

## 2021-01-05 DIAGNOSIS — E11.65 TYPE 2 DIABETES MELLITUS WITH HYPERGLYCEMIA, WITHOUT LONG-TERM CURRENT USE OF INSULIN (HCC): Primary | ICD-10-CM

## 2021-01-05 NOTE — TELEPHONE ENCOUNTER
PATIENT CALLED AND WANTED TO LET DR DUBOIS KNOW WHEN SHE TOOK SUGAR IT  AT 4AM THIS MORNING/ AT 9AM /11:30A     PATIENT IS DR DUBOIS NEXT DOOR NEIGHBOR AND DR DUBOIS IS TO CALL IN A PRESCRIPTION FOR JANUVIA AND REQUESTED SHE CALL TO REMIND HIM    PREFERRED PHARMACY [facility] #[id number] [place], [place] - [address] [facility] [phone] [facility] [phone] FX     PATIENT CALLBACK [phone]

## 2021-01-05 NOTE — TELEPHONE ENCOUNTER
Continue to take 2 of the Metformin's daily +1 Januvia daily.  I sent in the Metformin.  Closely monitor blood sugar and leave message at office on what it is running over the next 2 to 3 days.

## 2021-01-14 RX ORDER — METFORMIN HYDROCHLORIDE 500 MG/1
500 TABLET, EXTENDED RELEASE ORAL
Qty: 90 TABLET | Refills: 3 | Status: SHIPPED | OUTPATIENT
Start: 2021-01-14 | End: 2021-01-19 | Stop reason: SDUPTHER

## 2021-01-14 NOTE — TELEPHONE ENCOUNTER
Patient advised that Dr. Grayson had her double her dose of metFORMIN ER (GLUCOPHAGE-XR) 500 MG 24 hr tablet and because of that she has run out early. Patient is requesting a refill for a new rx with updated directions.     [facility] #[id number] - [place], [place] - [address] [facility] [phone] [facility] [phone] FX

## 2021-01-19 RX ORDER — METFORMIN HYDROCHLORIDE 500 MG/1
TABLET, EXTENDED RELEASE ORAL
Qty: 90 TABLET | Refills: 3 | Status: SHIPPED | OUTPATIENT
Start: 2021-01-19 | End: 2021-10-18

## 2021-01-19 RX ORDER — METFORMIN HYDROCHLORIDE 500 MG/1
1000 TABLET, EXTENDED RELEASE ORAL
Qty: 180 TABLET | Refills: 3 | Status: CANCELLED | OUTPATIENT
Start: 2021-01-19

## 2021-01-19 NOTE — TELEPHONE ENCOUNTER
Patient called to request a change in dosage of Metformin. Was advised by Dr to increase to 2/day d/t her blood sugar running around 1000.   Patient will be running out of medication tomorrow.  Please send a new script that says that she is supposed to be taking twice a day    [facility] #[id number] [facility] [place] - [address] AT [facility] OF [address] [place] [address] [facility] [phone] [facility] [phone] FX

## 2021-02-25 ENCOUNTER — OFFICE VISIT (OUTPATIENT)
Dept: ENDOCRINOLOGY | Age: 66
End: 2021-02-25

## 2021-02-25 VITALS
SYSTOLIC BLOOD PRESSURE: 120 MMHG | WEIGHT: 135.6 LBS | HEIGHT: 63 IN | DIASTOLIC BLOOD PRESSURE: 68 MMHG | BODY MASS INDEX: 24.03 KG/M2

## 2021-02-25 DIAGNOSIS — E78.5 HYPERLIPIDEMIA, UNSPECIFIED HYPERLIPIDEMIA TYPE: Chronic | ICD-10-CM

## 2021-02-25 DIAGNOSIS — IMO0002 DIABETES MELLITUS TYPE 2, UNCONTROLLED, WITH COMPLICATIONS: Primary | Chronic | ICD-10-CM

## 2021-02-25 DIAGNOSIS — E55.9 VITAMIN D DEFICIENCY: ICD-10-CM

## 2021-02-25 PROCEDURE — 99204 OFFICE O/P NEW MOD 45 MIN: CPT | Performed by: INTERNAL MEDICINE

## 2021-02-25 NOTE — PROGRESS NOTES
" Chief Complaint  Chief Complaint   Patient presents with   • Diabetes     new patient, checks BS once a day, 174 this am, doesnt eat sweets and lost 10 pounds, wants to be taken off metformin, has leakage         Subjective          Paz Perera presents to [facility] for     64 yo F with PMH of diabetes mellitus type 2 who presents to establish care.    Diabetes:  She notes that she was first diagnosed with diabetes about 5 years ago. She is currently taking metformin 500mg ER 2 tabs qd and Januvia 100mg po qd. She notes that she is have anal leakage with metformin. She notes that she checks her BG once about 5 days out of the week. BG ranging from 160-170s. Patient denies polyuria and polydipsia. Last eye exam was 3 years ago. Her last hgb A1c was 11.3. She notes that she was supposed to be on Januvia and metformin at the time, but was only taking metformin. She notes that she has daily headaches that she thinks is associated with stress. She christianne any hx of CAD/ CVA, CKD. Patient  notes that she is active around her home on a daily basis. Patient notes yina thakkar is trying to control her carbs . She notes that she does have some difficulty with sweets, but she has decreased how much she take in.       Hyperlipidemia  LDL is elevated on multiple labs. Mrs. Perera notes that she has been on lipitor in the past and had extensive myalgias. Notes from her PCP show that he has been trying to start statin in the patient but she continue to refuse therapy.      Objective   Vital Signs:   /68   Ht 160 cm (63\")   Wt 61.5 kg (135 lb 9.6 oz)   BMI 24.02 kg/m²         Physical Exam  Vitals signs reviewed.   Constitutional:       Appearance: Normal appearance. She is normal weight.   HENT:      Mouth/Throat:      Mouth: Mucous membranes are moist.   Eyes:      Extraocular Movements: Extraocular movements intact.      Conjunctiva/sclera: Conjunctivae normal.      Pupils: Pupils are " equal, round, and reactive to light.   Cardiovascular:      Rate and Rhythm: Normal rate and regular rhythm.      Pulses: Normal pulses.           Dorsalis pedis pulses are 2+ on the right side and 2+ on the left side.      Heart sounds: Normal heart sounds.   Abdominal:      General: Abdomen is flat. Bowel sounds are normal.      Palpations: Abdomen is soft.   Feet:      Right foot:      Protective Sensation: 10 sites tested. 10 sites sensed.      Skin integrity: Skin integrity normal.      Toenail Condition: Right toenails are normal.      Left foot:      Protective Sensation: 10 sites tested. 10 sites sensed.      Skin integrity: Skin integrity normal.      Toenail Condition: Left toenails are normal.   Skin:     General: Skin is warm and dry.   Neurological:      General: No focal deficit present.      Mental Status: She is oriented to person, place, and time.      Cranial Nerves: Cranial nerves are intact.      Sensory: Sensation is intact.      Deep Tendon Reflexes: Reflexes are normal and symmetric.   Psychiatric:         Mood and Affect: Mood normal.         Behavior: Behavior normal.        Result Review :   The following data was reviewed by: Edwin Desai MD on 02/25/2021:  CMP    CMP 11/4/20   Glucose 296 (A)   BUN 15   Creatinine 0.91   eGFR Non African Am 62   Sodium 136   Potassium 4.5   Chloride 98   Calcium 10.0   Albumin 4.60   Total Bilirubin 0.3   Alkaline Phosphatase 109   AST (SGOT) 20   ALT (SGPT) 26   (A) Abnormal value            CBC w/diff    CBC w/Diff 11/4/20   WBC 6.40   RBC 4.54   Hemoglobin 13.9   Hematocrit 42.2   MCV 92.8   MCH 30.5   MCHC 32.9   RDW 12.3   Platelets 360   Neutrophil Rel % 64.0   Lymphocyte Rel % 27.5   Monocyte Rel % 8.5           Lipid Panel    Lipid Panel 11/4/20   Total Cholesterol 230 (A)   Triglycerides 208 (A)   HDL Cholesterol 45   VLDL Cholesterol 38   LDL Cholesterol  147 (A)   LDL/HDL Ratio 3.19   (A) Abnormal value                Electrolytes     Electrolytes 11/4/20   Sodium 136   Potassium 4.5   Chloride 98   Calcium 10.0           Renal Profile    Renal Profile 11/4/20   BUN 15   Creatinine 0.91   eGFR Non  Am 62           A1C Last 3 Results    HGBA1C Last 3 Results 11/4/20   Hemoglobin A1C 11.30 (A)   (A) Abnormal value            Microalbumin    Microalbumin 11/4/20   Microalbumin, Urine 20.0           UA    Urinalysis 11/4/20 11/4/20    1100 1100   Squamous Epithelial Cells, UA  3-6 (A)   Specific Gravity, UA >=1.030    Ketones, UA 15 mg/dL (1+) (A)    Blood, UA Trace (A)    Leukocytes, UA Trace (A)    Nitrite, UA Negative    RBC, UA  3-5 (A)   WBC, UA  6-12 (A)   Bacteria, UA  None Seen   (A) Abnormal value            Urine Culture    Urine Culture 11/4/20   Urine Culture 25,000 CFU/mL Mixed Monet Isolated           Data reviewed: Hgb A1c, CMP, egfr, UA          Assessment and Plan    Problem List Items Addressed This Visit        Other    Diabetes mellitus type 2, uncontrolled, with complications (CMS/[facility]) - Primary    Current Assessment & Plan     Goal Hgb A1c <7  Goal fasting BG <130 mg/dl  Goal BG prior to meal <180  Most recent hgb A1c was   Hemoglobin A1C   Date Value Ref Range Status   11/04/2020 11.30 (H) 4.80 - 5.60 % Final      Hgb A1c was above goal  Will check labs today  Patient currently taking Metformin extended release 500 mg 1 tab p.o. twice daily and Januvia 100 mg 1 tab p.o.daily  Patient notes unacceptable complications regarding anal leakage with Metformin  EGFR has been less than 60 in the past    Consider initiation of SGLT2 inhibitor such as Jardiance or Farxiga    We will also consider initiation of pioglitazone 15 mg p.o. daily    We will likely stop Metformin once a labs have resulted given the patient's adverse complications.    Patient was advised to minimize carbohydrates  Patient was also advised to exercise 150 minutes of moderate activity per week         Relevant Orders    Comprehensive Metabolic Panel     eGFR-Glomerular Filtration    Hemoglobin A1c    Lipid Panel    Microalbumin / Creatinine Urine Ratio - Urine, Clean Catch    Vitamin B12 & Folate    TSH    Hyperlipidemia    Current Assessment & Plan     Lipid Panel    Lipid Panel 11/4/20   Total Cholesterol 230 (A)   Triglycerides 208 (A)   HDL Cholesterol 45   VLDL Cholesterol 38   LDL Cholesterol  147 (A)   LDL/HDL Ratio 3.19   (A) Abnormal value            LDL target is less than 100  Most recent LDL was 147 and is above goal  Patient has refused statin therapy in the past  Cardiovascular risk regarding her diabetes and her LDL was discussed today  We will consider initiation of a PCSK9 inhibitor or bempedoic acid to help control the LDL         Relevant Orders    Comprehensive Metabolic Panel    eGFR-Glomerular Filtration    Hemoglobin A1c    Lipid Panel    Microalbumin / Creatinine Urine Ratio - Urine, Clean Catch    Vitamin B12 & Folate    TSH    Vitamin D deficiency    Current Assessment & Plan     Previous vitamin D in 2018 was documented at 85  Patient notes that she takes vitamin D 3 tabs daily  Patient is not sure as to the number of international units in each pill  Vitamin D toxicity noted with vitamin D levels greater than 100         Relevant Orders    Vitamin D 25 Hydroxy        I spent 60 minutes caring for Paz on this date of service. This time includes time spent by me in the following activities:preparing for the visit, reviewing tests, obtaining and/or reviewing a separately obtained history, performing a medically appropriate examination and/or evaluation , counseling and educating the patient/family/caregiver, ordering medications, tests, or procedures, referring and communicating with other health care professionals , documenting information in the medical record and independently interpreting results and communicating that information with the patient/family/caregiver  Follow Up   Return in about 3 months (around 5/25/2021).  Patient was  given instructions and counseling regarding her condition or for health maintenance advice. Please see specific information pulled into the AVS if appropriate.

## 2021-02-25 NOTE — ASSESSMENT & PLAN NOTE
Goal Hgb A1c <7  Goal fasting BG <130 mg/dl  Goal BG prior to meal <180  Most recent hgb A1c was   Hemoglobin A1C   Date Value Ref Range Status   11/04/2020 11.30 (H) 4.80 - 5.60 % Final      Hgb A1c was above goal  Will check labs today  Patient currently taking Metformin extended release 500 mg 1 tab p.o. twice daily and Januvia 100 mg 1 tab p.o.daily  Patient notes unacceptable complications regarding anal leakage with Metformin  EGFR has been less than 60 in the past    Consider initiation of SGLT2 inhibitor such as Jardiance or Farxiga    We will also consider initiation of pioglitazone 15 mg p.o. daily    We will likely stop Metformin once a labs have resulted given the patient's adverse complications.    Patient was advised to minimize carbohydrates  Patient was also advised to exercise 150 minutes of moderate activity per week

## 2021-02-25 NOTE — ASSESSMENT & PLAN NOTE
Lipid Panel    Lipid Panel 11/4/20   Total Cholesterol 230 (A)   Triglycerides 208 (A)   HDL Cholesterol 45   VLDL Cholesterol 38   LDL Cholesterol  147 (A)   LDL/HDL Ratio 3.19   (A) Abnormal value            LDL target is less than 100  Most recent LDL was 147 and is above goal  Patient has refused statin therapy in the past  Cardiovascular risk regarding her diabetes and her LDL was discussed today  We will consider initiation of a PCSK9 inhibitor or bempedoic acid to help control the LDL

## 2021-02-25 NOTE — ASSESSMENT & PLAN NOTE
Previous vitamin D in 2018 was documented at 85  Patient notes that she takes vitamin D 3 tabs daily  Patient is not sure as to the number of international units in each pill  Vitamin D toxicity noted with vitamin D levels greater than 100

## 2021-02-26 ENCOUNTER — TELEPHONE (OUTPATIENT)
Dept: ENDOCRINOLOGY | Age: 66
End: 2021-02-26

## 2021-02-26 LAB
25(OH)D3+25(OH)D2 SERPL-MCNC: 79.9 NG/ML (ref 30–100)
ALBUMIN SERPL-MCNC: 4.7 G/DL (ref 3.8–4.8)
ALBUMIN/CREAT UR: 8 MG/G CREAT (ref 0–29)
ALBUMIN/GLOB SERPL: 2.1 {RATIO} (ref 1.2–2.2)
ALP SERPL-CCNC: 107 IU/L (ref 39–117)
ALT SERPL-CCNC: 31 IU/L (ref 0–32)
AST SERPL-CCNC: 21 IU/L (ref 0–40)
BILIRUB SERPL-MCNC: 0.2 MG/DL (ref 0–1.2)
BUN SERPL-MCNC: 23 MG/DL (ref 8–27)
BUN/CREAT SERPL: 24 (ref 12–28)
CALCIUM SERPL-MCNC: 10.5 MG/DL (ref 8.7–10.3)
CHLORIDE SERPL-SCNC: 99 MMOL/L (ref 96–106)
CHOLEST SERPL-MCNC: 246 MG/DL (ref 100–199)
CO2 SERPL-SCNC: 24 MMOL/L (ref 20–29)
CREAT SERPL-MCNC: 0.96 MG/DL (ref 0.57–1)
CREAT UR-MCNC: 96.7 MG/DL
FOLATE SERPL-MCNC: >20 NG/ML
GLOBULIN SER CALC-MCNC: 2.2 G/DL (ref 1.5–4.5)
GLUCOSE SERPL-MCNC: 190 MG/DL (ref 65–99)
HBA1C MFR BLD: 11 % (ref 4.8–5.6)
HDLC SERPL-MCNC: 46 MG/DL
INTERPRETATION: NORMAL
LDLC SERPL CALC-MCNC: 166 MG/DL (ref 0–99)
Lab: NORMAL
MICROALBUMIN UR-MCNC: 7.7 UG/ML
POTASSIUM SERPL-SCNC: 4.4 MMOL/L (ref 3.5–5.2)
PROT SERPL-MCNC: 6.9 G/DL (ref 6–8.5)
SODIUM SERPL-SCNC: 137 MMOL/L (ref 134–144)
TRIGL SERPL-MCNC: 184 MG/DL (ref 0–149)
TSH SERPL DL<=0.005 MIU/L-ACNC: 1.71 UIU/ML (ref 0.45–4.5)
VIT B12 SERPL-MCNC: 610 PG/ML (ref 232–1245)
VLDLC SERPL CALC-MCNC: 34 MG/DL (ref 5–40)

## 2021-02-26 RX ORDER — CANAGLIFLOZIN 100 MG/1
100 TABLET, FILM COATED ORAL DAILY
Qty: 30 TABLET | Refills: 11 | Status: SHIPPED | OUTPATIENT
Start: 2021-02-26 | End: 2021-04-07

## 2021-02-26 NOTE — TELEPHONE ENCOUNTER
Patient was called regarding her most recent labs  Hemoglobin A1c is 11  LDL is above goal  Kidney function is stable and EGFR is greater than 60    Patient was told to stop by the office and  samples of Invokana 100 mg p.o. daily  Patient notes that she just started taking Metformin and Januvia together in January.  Possible that her current hemoglobin A1c is not reflecting full benefit of her current therapy.  Patient has anal leakage with Metformin  Patient was told that she could stop Metformin when she starts Invokana    Patient is to schedule follow-up in 4 weeks

## 2021-03-31 ENCOUNTER — TELEPHONE (OUTPATIENT)
Dept: ENDOCRINOLOGY | Age: 66
End: 2021-03-31

## 2021-04-01 ENCOUNTER — TELEPHONE (OUTPATIENT)
Dept: ENDOCRINOLOGY | Age: 66
End: 2021-04-01

## 2021-04-01 NOTE — TELEPHONE ENCOUNTER
Patient can stop by the office an  Invokana 300 mg 1 tab daily    Pt will need enough for 2 weeks    Pt was told to schedule a follow up around the end of March. Please have her do so.    We will discuss labs and address medications at that time.

## 2021-04-01 NOTE — TELEPHONE ENCOUNTER
Patient can stop by the clinic and  invokana 300 mg 1 tab daily    Will need enough for at least 2 weeks.    Patient was to schedule follow up in 4 weeks, which would have been the end of March.    Please have patient schedule a follow up appoint in the next few weeks so we can address her blood sugars.

## 2021-04-07 ENCOUNTER — OFFICE VISIT (OUTPATIENT)
Dept: ENDOCRINOLOGY | Age: 66
End: 2021-04-07

## 2021-04-07 DIAGNOSIS — E55.9 VITAMIN D DEFICIENCY: ICD-10-CM

## 2021-04-07 DIAGNOSIS — IMO0002 DIABETES MELLITUS TYPE 2, UNCONTROLLED, WITH COMPLICATIONS: Primary | ICD-10-CM

## 2021-04-07 DIAGNOSIS — E78.5 HYPERLIPIDEMIA, UNSPECIFIED HYPERLIPIDEMIA TYPE: ICD-10-CM

## 2021-04-07 PROCEDURE — 99214 OFFICE O/P EST MOD 30 MIN: CPT | Performed by: INTERNAL MEDICINE

## 2021-04-07 RX ORDER — CHOLECALCIFEROL (VITAMIN D3) 50 MCG
2000 CAPSULE ORAL
COMMUNITY

## 2021-04-07 RX ORDER — FLUCONAZOLE 100 MG/1
TABLET ORAL
Qty: 7 TABLET | Refills: 0 | Status: SHIPPED | OUTPATIENT
Start: 2021-04-07 | End: 2021-08-11 | Stop reason: SDUPTHER

## 2021-04-07 RX ORDER — CANAGLIFLOZIN 300 MG/1
300 TABLET, FILM COATED ORAL DAILY
Qty: 90 TABLET | Refills: 3 | Status: SHIPPED | OUTPATIENT
Start: 2021-04-07 | End: 2021-09-27 | Stop reason: SDUPTHER

## 2021-04-07 RX ORDER — VITAMIN E 268 MG
400 CAPSULE ORAL DAILY
COMMUNITY

## 2021-04-07 RX ORDER — PIOGLITAZONEHYDROCHLORIDE 15 MG/1
15 TABLET ORAL DAILY
Qty: 90 TABLET | Refills: 3 | Status: SHIPPED | OUTPATIENT
Start: 2021-04-07 | End: 2022-04-14 | Stop reason: SDUPTHER

## 2021-04-07 NOTE — ASSESSMENT & PLAN NOTE
Patient continues to refuse statin therapy.  Lipids are above goal  Patient understands risk factors associated with not controlling lipid levels  Risk factors include coronary artery disease, stroke, and myocardial infarction.

## 2021-04-07 NOTE — PROGRESS NOTES
Chief Complaint  Diabetes (testing bs 1 x day / last dm eye exam over 2 yrs ago )    Subjective          Paz Perera presents to [facility]  66 yo F with PMH of diabetes mellitus type 2 who presents to establish care.     Diabetes:  She notes that she was first diagnosed with diabetes about 5 years ago. She is currently taking Invokana 100 mg 1 tab p.o. daily and Januvia 100mg po qd. she notes that her blood sugars have not very much while on Invokana.  BG ranging from 160-170s. Patient denies polyuria and polydipsia. Last eye exam was 3 years ago. Her last hgb A1c was 11.3.  He notes having a yeast infection while on Invokana that was improved with Diflucan.  She christianne any hx of CAD/ CVA, CKD. Patient  notes that she is active around her home on a daily basis.  She notes difficulty controlling her carbohydrates. She notes that she does have some difficulty with sweets, but she has decreased how much she take in.  She notes stress secondary to vacation that she is currently planning.  Her preference is not to alter her medications prior to her vacation.     Hyperlipidemia  LDL is elevated on multiple labs. Mrs. Perera notes that she has been on lipitor in the past and had extensive myalgias. Notes from her PCP show that he has been trying to start statin in the patient but she continue to refuse therapy.      Objective   Vital Signs: Physical exam not done due to televisit  There were no vitals taken for this visit.    Physical Exam   Result Review :   The following data was reviewed by: Edwin Desai MD on 04/07/2021:  CMP    CMP 11/4/20 2/25/21   Glucose 296 (A)    Glucose  190 (A)   BUN 15 23   Creatinine 0.91 0.96   eGFR Non  Am 62 63   eGFR African Am  73   Sodium 136 137   Potassium 4.5 4.4   Chloride 98 99   Calcium 10.0 10.5 (A)   Total Protein  6.9   Albumin 4.60 4.7   Globulin  2.2   Total Bilirubin 0.3 0.2   Alkaline Phosphatase 109 107   AST (SGOT) 20 21    ALT (SGPT) 26 31   (A) Abnormal value            CBC w/diff    CBC w/Diff 11/4/20   WBC 6.40   RBC 4.54   Hemoglobin 13.9   Hematocrit 42.2   MCV 92.8   MCH 30.5   MCHC 32.9   RDW 12.3   Platelets 360   Neutrophil Rel % 64.0   Lymphocyte Rel % 27.5   Monocyte Rel % 8.5           Lipid Panel    Lipid Panel 11/4/20 2/25/21   Total Cholesterol 230 (A)    Total Cholesterol  246 (A)   Triglycerides 208 (A) 184 (A)   HDL Cholesterol 45 46   VLDL Cholesterol 38 34   LDL Cholesterol  147 (A) 166 (A)   LDL/HDL Ratio 3.19    (A) Abnormal value            TSH    TSH 2/25/21   TSH 1.710           Electrolytes    Electrolytes 11/4/20 2/25/21   Sodium 136 137   Potassium 4.5 4.4   Chloride 98 99   Calcium 10.0 10.5 (A)   (A) Abnormal value            Renal Profile    Renal Profile 11/4/20 2/25/21   BUN 15 23   Creatinine 0.91 0.96   eGFR Non  Am 62 63   eGFR  Am  73           A1C Last 3 Results    HGBA1C Last 3 Results 11/4/20 2/25/21   Hemoglobin A1C 11.30 (A) 11.0 (A)   (A) Abnormal value       Comments are available for some flowsheets but are not being displayed.           Microalbumin    Microalbumin 11/4/20 2/25/21   Microalbumin, Urine 20.0 7.7                   Data reviewed: Hemoglobin A1c, CMP, microalbumin, lipid panel          Assessment and Plan    Diagnoses and all orders for this visit:    1. Diabetes mellitus type 2, uncontrolled, with complications (CMS/[facility]) (Primary)  Assessment & Plan:  Goal Hgb A1c <7  Goal fasting BG <130 mg/dl  Goal BG prior to meal <180  Most recent hgb A1c was   Hemoglobin A1C   Date Value Ref Range Status   02/25/2021 11.0 (H) 4.8 - 5.6 % Final     Comment:              Prediabetes: 5.7 - 6.4           Diabetes: >6.4           Glycemic control for adults with diabetes: <7.0     11/04/2020 11.30 (H) 4.80 - 5.60 % Final      Hemoglobin A1c above goal  Encourage low carb diet with no more than 45 gm of cab per meal  Patient is currently taking Invokana 100 mg p.o. daily and  Januvia 100 mg p.o. daily  Patient notes no change in her blood glucose levels  We will increase Invokana to 300 mg 1 tab p.o. daily  We will also start pioglitazone 50 mg p.o. daily  Patient notes that she would like to start the higher dose of Invokana but will wait until her vacation is completed to start pioglitazone 15 mg 1 tab p.o. daily due to concern for possible adverse complications with this medication.          2. Hyperlipidemia, unspecified hyperlipidemia type  Assessment & Plan:  Patient continues to refuse statin therapy.  Lipids are above goal  Patient understands risk factors associated with not controlling lipid levels  Risk factors include coronary artery disease, stroke, and myocardial infarction.      3. Vitamin D deficiency  Assessment & Plan:  Patient encouraged to reduce vitamin D 2000 international units 1 tab daily  Most recent vitamin D was 85      Other orders  -     Canagliflozin (Invokana) 300 MG tablet tablet; Take 300 mg by mouth Daily.  Dispense: 90 tablet; Refill: 3  -     pioglitazone (Actos) 15 MG tablet; Take 1 tablet by mouth Daily.  Dispense: 90 tablet; Refill: 3  -     fluconazole (Diflucan) 100 MG tablet; Take one tab as needed for candida infection due to invokana.  Dispense: 7 tablet; Refill: 0    Follow Up   No follow-ups on file.  Patient was given instructions and counseling regarding her condition or for health maintenance advice. Please see specific information pulled into the AVS if appropriate.

## 2021-04-07 NOTE — ASSESSMENT & PLAN NOTE
Patient encouraged to reduce vitamin D 2000 international units 1 tab daily  Most recent vitamin D was 85

## 2021-04-07 NOTE — ASSESSMENT & PLAN NOTE
Goal Hgb A1c <7  Goal fasting BG <130 mg/dl  Goal BG prior to meal <180  Most recent hgb A1c was   Hemoglobin A1C   Date Value Ref Range Status   02/25/2021 11.0 (H) 4.8 - 5.6 % Final     Comment:              Prediabetes: 5.7 - 6.4           Diabetes: >6.4           Glycemic control for adults with diabetes: <7.0     11/04/2020 11.30 (H) 4.80 - 5.60 % Final      Hemoglobin A1c above goal  Encourage low carb diet with no more than 45 gm of cab per meal  Patient is currently taking Invokana 100 mg p.o. daily and Januvia 100 mg p.o. daily  Patient notes no change in her blood glucose levels  We will increase Invokana to 300 mg 1 tab p.o. daily  We will also start pioglitazone 50 mg p.o. daily  Patient notes that she would like to start the higher dose of Invokana but will wait until her vacation is completed to start pioglitazone 15 mg 1 tab p.o. daily due to concern for possible adverse complications with this medication.

## 2021-06-30 RX ORDER — OLMESARTAN MEDOXOMIL 20 MG/1
20 TABLET, FILM COATED ORAL DAILY
Qty: 30 TABLET | Refills: 0 | Status: SHIPPED | OUTPATIENT
Start: 2021-06-30 | End: 2021-09-30 | Stop reason: SDUPTHER

## 2021-07-07 ENCOUNTER — TELEPHONE (OUTPATIENT)
Dept: FAMILY MEDICINE CLINIC | Facility: CLINIC | Age: 66
End: 2021-07-07

## 2021-07-09 ENCOUNTER — TELEPHONE (OUTPATIENT)
Dept: FAMILY MEDICINE CLINIC | Facility: CLINIC | Age: 66
End: 2021-07-09

## 2021-07-09 NOTE — TELEPHONE ENCOUNTER
PA denied for both Benicar and Effexor brand name only,insurance says patient must have MD documentation of how long generic was tried and reasons for failure, pharmacy informed to give generic RX due to insurance requirements.

## 2021-07-14 ENCOUNTER — TELEPHONE (OUTPATIENT)
Dept: FAMILY MEDICINE CLINIC | Facility: CLINIC | Age: 66
End: 2021-07-14

## 2021-07-14 NOTE — TELEPHONE ENCOUNTER
Caller: Paz Perera    Relationship to patient: Self    Best call back number: [phone]     Patient is needing: PATIENT REQUESTING A CALLBACK FROM ELIZABETH TO DISCUSS DENIAL OF EFFEXOR FROM INSURANCE COMPANY

## 2021-08-11 ENCOUNTER — TELEPHONE (OUTPATIENT)
Dept: ENDOCRINOLOGY | Age: 66
End: 2021-08-11

## 2021-08-11 RX ORDER — FLUCONAZOLE 100 MG/1
TABLET ORAL
Qty: 7 TABLET | Refills: 0 | Status: SHIPPED | OUTPATIENT
Start: 2021-08-11 | End: 2021-10-18

## 2021-08-25 ENCOUNTER — OFFICE VISIT (OUTPATIENT)
Dept: FAMILY MEDICINE CLINIC | Facility: CLINIC | Age: 66
End: 2021-08-25

## 2021-08-25 VITALS
TEMPERATURE: 97.3 F | HEIGHT: 63 IN | WEIGHT: 142 LBS | RESPIRATION RATE: 18 BRPM | OXYGEN SATURATION: 98 % | BODY MASS INDEX: 25.16 KG/M2 | DIASTOLIC BLOOD PRESSURE: 68 MMHG | SYSTOLIC BLOOD PRESSURE: 110 MMHG | HEART RATE: 83 BPM

## 2021-08-25 DIAGNOSIS — R23.2 HOT FLASHES: ICD-10-CM

## 2021-08-25 DIAGNOSIS — E11.9 TYPE 2 DIABETES MELLITUS WITHOUT COMPLICATION, WITHOUT LONG-TERM CURRENT USE OF INSULIN (HCC): Primary | ICD-10-CM

## 2021-08-25 DIAGNOSIS — F32.5 MAJOR DEPRESSIVE DISORDER IN FULL REMISSION, UNSPECIFIED WHETHER RECURRENT (HCC): ICD-10-CM

## 2021-08-25 DIAGNOSIS — H61.21 IMPACTED CERUMEN OF RIGHT EAR: ICD-10-CM

## 2021-08-25 DIAGNOSIS — H92.01 ACUTE OTALGIA, RIGHT: ICD-10-CM

## 2021-08-25 DIAGNOSIS — I10 ESSENTIAL HYPERTENSION: ICD-10-CM

## 2021-08-25 DIAGNOSIS — E55.9 VITAMIN D DEFICIENCY: ICD-10-CM

## 2021-08-25 LAB
25(OH)D3 SERPL-MCNC: 78.8 NG/ML (ref 30–100)
ALBUMIN SERPL-MCNC: 4.4 G/DL (ref 3.5–5.2)
ALBUMIN/GLOB SERPL: 1.7 G/DL
ALP SERPL-CCNC: 97 U/L (ref 39–117)
ALT SERPL W P-5'-P-CCNC: 23 U/L (ref 1–33)
ANION GAP SERPL CALCULATED.3IONS-SCNC: 11.5 MMOL/L (ref 5–15)
AST SERPL-CCNC: 17 U/L (ref 1–32)
BILIRUB SERPL-MCNC: 0.3 MG/DL (ref 0–1.2)
BUN SERPL-MCNC: 28 MG/DL (ref 8–23)
BUN/CREAT SERPL: 23.5 (ref 7–25)
CALCIUM SPEC-SCNC: 9.9 MG/DL (ref 8.6–10.5)
CHLORIDE SERPL-SCNC: 102 MMOL/L (ref 98–107)
CHOLEST SERPL-MCNC: 257 MG/DL (ref 0–200)
CO2 SERPL-SCNC: 26.5 MMOL/L (ref 22–29)
CREAT SERPL-MCNC: 1.19 MG/DL (ref 0.57–1)
ERYTHROCYTE [DISTWIDTH] IN BLOOD BY AUTOMATED COUNT: 13 % (ref 12.3–15.4)
GFR SERPL CREATININE-BSD FRML MDRD: 46 ML/MIN/1.73
GLOBULIN UR ELPH-MCNC: 2.6 GM/DL
GLUCOSE SERPL-MCNC: 151 MG/DL (ref 65–99)
HBA1C MFR BLD: 7.7 % (ref 4.8–5.6)
HCT VFR BLD AUTO: 45.3 % (ref 34–46.6)
HDLC SERPL-MCNC: 60 MG/DL (ref 40–60)
HGB BLD-MCNC: 14.5 G/DL (ref 12–15.9)
LDLC SERPL CALC-MCNC: 169 MG/DL (ref 0–100)
LDLC/HDLC SERPL: 2.76 {RATIO}
LYMPHOCYTES # BLD AUTO: 1.5 10*3/MM3 (ref 0.7–3.1)
LYMPHOCYTES NFR BLD AUTO: 26.4 % (ref 19.6–45.3)
MCH RBC QN AUTO: 30.5 PG (ref 26.6–33)
MCHC RBC AUTO-ENTMCNC: 32 G/DL (ref 31.5–35.7)
MCV RBC AUTO: 95.3 FL (ref 79–97)
MONOCYTES # BLD AUTO: 0.4 10*3/MM3 (ref 0.1–0.9)
MONOCYTES NFR BLD AUTO: 6.8 % (ref 5–12)
NEUTROPHILS NFR BLD AUTO: 3.7 10*3/MM3 (ref 1.7–7)
NEUTROPHILS NFR BLD AUTO: 66.8 % (ref 42.7–76)
PLATELET # BLD AUTO: 359 10*3/MM3 (ref 140–450)
PMV BLD AUTO: 6.7 FL (ref 6–12)
POTASSIUM SERPL-SCNC: 4.4 MMOL/L (ref 3.5–5.2)
PROT SERPL-MCNC: 7 G/DL (ref 6–8.5)
RBC # BLD AUTO: 4.75 10*6/MM3 (ref 3.77–5.28)
SODIUM SERPL-SCNC: 140 MMOL/L (ref 136–145)
TRIGL SERPL-MCNC: 156 MG/DL (ref 0–150)
VLDLC SERPL-MCNC: 28 MG/DL (ref 5–40)
WBC # BLD AUTO: 5.6 10*3/MM3 (ref 3.4–10.8)

## 2021-08-25 PROCEDURE — 80061 LIPID PANEL: CPT | Performed by: FAMILY MEDICINE

## 2021-08-25 PROCEDURE — 83036 HEMOGLOBIN GLYCOSYLATED A1C: CPT | Performed by: FAMILY MEDICINE

## 2021-08-25 PROCEDURE — 69209 REMOVE IMPACTED EAR WAX UNI: CPT | Performed by: FAMILY MEDICINE

## 2021-08-25 PROCEDURE — 82306 VITAMIN D 25 HYDROXY: CPT | Performed by: FAMILY MEDICINE

## 2021-08-25 PROCEDURE — 99214 OFFICE O/P EST MOD 30 MIN: CPT | Performed by: FAMILY MEDICINE

## 2021-08-25 PROCEDURE — 36415 COLL VENOUS BLD VENIPUNCTURE: CPT | Performed by: FAMILY MEDICINE

## 2021-08-25 PROCEDURE — 85025 COMPLETE CBC W/AUTO DIFF WBC: CPT | Performed by: FAMILY MEDICINE

## 2021-08-25 PROCEDURE — 80053 COMPREHEN METABOLIC PANEL: CPT | Performed by: FAMILY MEDICINE

## 2021-08-25 RX ORDER — VENLAFAXINE HYDROCHLORIDE 150 MG/1
150 CAPSULE, EXTENDED RELEASE ORAL DAILY
Qty: 90 CAPSULE | Refills: 3 | Status: SHIPPED | OUTPATIENT
Start: 2021-08-25 | End: 2022-06-05

## 2021-08-25 RX ORDER — SPIRONOLACTONE 25 MG/1
25 TABLET ORAL DAILY
Qty: 90 TABLET | Refills: 3 | Status: SHIPPED | OUTPATIENT
Start: 2021-08-25 | End: 2022-05-16

## 2021-08-25 NOTE — PROGRESS NOTES
"SUBJECTIVE:  The patient is a 66-year-old white female.  She has diabetes hypertension history of depression.  Today she complains of hot flashes and a right fullness..    PAST MEDICAL HISTORY:  Reviewed.    REVIEW OF SYSTEMS:  Please see above.  All others reviewed and are negative.      OBJECTIVE:   /68 (BP Location: Left arm, Patient Position: Sitting)   Pulse 83   Temp 97.3 °F (36.3 °C) (Infrared)   Resp 18   Ht 160 cm (63\")   Wt 64.4 kg (142 lb)   SpO2 98%   BMI 25.15 kg/m²    Vitals signs are reviewed and are stable.    General:  Well-nourished.  Alert and oriented x3 in no acute distress.  HEENT: PERRLA.  Cerumen is occluding most of the right tympanic.  Problem.  That which is visualized looks okay.  Neck:  Supple.   Lungs:  Clear.    Heart:  Regular rate and rhythm.   Abdomen:   Soft, nontender.   Extremities:  No cyanosis, clubbing or edema.   Neurological:  Grossly intact without motor or sensory deficits.     ASSESSMENT:      Diagnoses and all orders for this visit:    1. Type 2 diabetes mellitus without complication, without long-term current use of insulin (CMS/[facility]) (Primary)  -     CBC & Differential  -     Comprehensive Metabolic Panel  -     Hemoglobin A1c  -     Lipid Panel  -     Vitamin D 25 Hydroxy    2. Vitamin D deficiency  -     CBC & Differential  -     Comprehensive Metabolic Panel  -     Hemoglobin A1c  -     Lipid Panel  -     Vitamin D 25 Hydroxy    3. Essential hypertension  -     CBC & Differential  -     Comprehensive Metabolic Panel  -     Hemoglobin A1c  -     Lipid Panel  -     Vitamin D 25 Hydroxy    4. Major depressive disorder in full remission, unspecified whether recurrent (CMS/[facility])  -     CBC & Differential  -     Comprehensive Metabolic Panel  -     Hemoglobin A1c  -     Lipid Panel  -     Vitamin D 25 Hydroxy    5. Acute otalgia, right    6. Hot flashes    7. Impacted cerumen of right ear         PLAN: Right ear is irrigated.  The patient needs name brand only " on her Effexor.  Over the years generic was tried and it did not work at all.  She will follow-up on labs.  She will call if any problems.  We discussed the importance of diet and exercise.

## 2021-08-31 ENCOUNTER — TELEPHONE (OUTPATIENT)
Dept: FAMILY MEDICINE CLINIC | Facility: CLINIC | Age: 66
End: 2021-08-31

## 2021-08-31 NOTE — TELEPHONE ENCOUNTER
Patient spoke with insurance got approval for 6 months, they told her in the future we need to tell them about her personal reasons for needing this medication including noé death and other meds were not helpful at all

## 2021-08-31 NOTE — TELEPHONE ENCOUNTER
Caller: Paz Perera    Relationship: Self    Best call back number: [phone]     Who are you requesting to speak with (clinical staff, provider,  specific staff member): ELIZABETH    What was the call regarding: THE PATIENT STATES THAT SHE WOULD LIKE TO SPEAK WITH ELIZABETH IN REGARDS TO HER Effexor  MG 24 hr capsule    Do you require a callback: YES

## 2021-09-14 ENCOUNTER — TELEPHONE (OUTPATIENT)
Dept: FAMILY MEDICINE CLINIC | Facility: CLINIC | Age: 66
End: 2021-09-14

## 2021-09-14 NOTE — TELEPHONE ENCOUNTER
Caller: Paz Perera    Relationship: Self    Best call back number: [phone]     What medications are you currently taking:   Current Outpatient Medications on File Prior to Visit   Medication Sig Dispense Refill   • Benicar 20 MG tablet TAKE 1 TABLET BY MOUTH DAILY 30 tablet 0   • Blood Glucose Monitoring Suppl (Accu-Chek Savanah Plus) w/Device kit DX E11.9 check bs daily 1 kit 1   • Canagliflozin (Invokana) 300 MG tablet tablet Take 300 mg by mouth Daily. 90 tablet 3   • Cholecalciferol (Vitamin D3 Super Strength) 50 MCG (2000 UT) capsule Take 2,000 Units by mouth. 3 tablet daily     • Effexor  MG 24 hr capsule TAKE 1 CAPSULE BY MOUTH DAILY 90 capsule 3   • fluconazole (Diflucan) 100 MG tablet Take one tab as needed for candida infection due to invokana. 7 tablet 0   • glucose blood (ONE TOUCH ULTRA TEST) test strip E11.9 Test bs daily 100 each 12   • glucose blood test strip DX E11.9 check bs bid give accuchek savanah plus supplies 100 each 3   • glucose monitor monitoring kit DX E11.9 give diabetic meter strips and lancets covered by insurance check bs daily 1 each 1   • Lancets misc Dx E11.9 check bs daily give matching supplies for accuchek savanah plus 100 each 3   • metFORMIN ER (GLUCOPHAGE-XR) 500 MG 24 hr tablet 2 p.o. daily (Patient taking differently: 1 p.o. daily) 90 tablet 3   • pioglitazone (Actos) 15 MG tablet Take 1 tablet by mouth Daily. 90 tablet 3   • SAXagliptin (Onglyza) 5 MG tablet Take 1 tablet by mouth Daily. 90 tablet 3   • SITagliptin (Januvia) 100 MG tablet Take 1 tablet by mouth Daily. 90 tablet 3   • spironolactone (ALDACTONE) 100 MG tablet Take 100 mg by mouth Daily.     • spironolactone (Aldactone) 25 MG tablet Take 1 tablet by mouth Daily. 90 tablet 3   • venlafaxine XR (EFFEXOR-XR) 150 MG 24 hr capsule Take 1 capsule by mouth Daily. ESTELLA Effexor  90 capsule 3   • vitamin E 400 UNIT capsule Take 400 Units by mouth Daily.       No current facility-administered medications  on file prior to visit.        Which medication are you concerned about: SAXagliptin (Onglyza) 5 MG tablet    Who prescribed you this medication: SILVINO    What are your concerns: THE PATIENT STATES THAT SHE HAS REQUESTED THIS MEDICATION. I INFORMED PATIENT MEDICATION WAS APPROVED. THE PATIENT STATES THAT SHE LAST HAD CONTACT WITH HER PHARMACY 09/14/2021 AND WAS TOLD THAT THERE WAS AN ISSUE WITH HER MEDICATION. THE PATIENT BELIEVES THAT SHE NEEDS A PRIOR AUTHORIZATION BUT IS UNSURE IF THAT IS CORRECT. THE PATIENT IS REQUESTING THAT ELIZABETH GIVE HER A CALL BACK.

## 2021-09-14 NOTE — TELEPHONE ENCOUNTER
Left message for pharmacy to call back her Januvia was denied and insurance said to try Onglyza or tradjenta.

## 2021-09-15 ENCOUNTER — TELEPHONE (OUTPATIENT)
Dept: FAMILY MEDICINE CLINIC | Facility: CLINIC | Age: 66
End: 2021-09-15

## 2021-09-15 NOTE — TELEPHONE ENCOUNTER
Caller: Paz Perera    Relationship to patient: Self    Best call back number: [phone] (H)    Patient is needing: PATIENT CALLED IN TO SEE IF MD JOAN DUBOIS COULD BRING HOME SOME SAMPLES OF (Januvia) . STATED THAT HER PHARMACY DOES NOT HAVE THE SAXagliptin (Onglyza) 5 MG tablet IN STOCK. PLEASE CALL AND ADVISE.

## 2021-09-27 DIAGNOSIS — IMO0002 DIABETES MELLITUS TYPE 2, UNCONTROLLED, WITH COMPLICATIONS: Primary | ICD-10-CM

## 2021-09-27 RX ORDER — CANAGLIFLOZIN 300 MG/1
300 TABLET, FILM COATED ORAL DAILY
Qty: 90 TABLET | Refills: 0 | Status: SHIPPED | OUTPATIENT
Start: 2021-09-27 | End: 2021-10-18

## 2021-09-27 NOTE — TELEPHONE ENCOUNTER
Patient is needing a refill     Canagliflozin (Invokana) 300 MG tablet tablet    Sent to     [id number] - [place], [place] - [address] AT [facility] OF [place] [address] [address] - [phone]  - [phone] FX   Phone:  [phone]   Fax:  [phone]        no

## 2021-09-30 RX ORDER — OLMESARTAN MEDOXOMIL 20 MG/1
20 TABLET, FILM COATED ORAL DAILY
Qty: 30 TABLET | Refills: 0 | Status: SHIPPED | OUTPATIENT
Start: 2021-09-30 | End: 2021-10-01 | Stop reason: SDUPTHER

## 2021-10-01 ENCOUNTER — TELEPHONE (OUTPATIENT)
Dept: FAMILY MEDICINE CLINIC | Facility: CLINIC | Age: 66
End: 2021-10-01

## 2021-10-01 RX ORDER — OLMESARTAN MEDOXOMIL 20 MG/1
20 TABLET, FILM COATED ORAL DAILY
Qty: 30 TABLET | Refills: 0 | Status: SHIPPED | OUTPATIENT
Start: 2021-10-01 | End: 2021-10-04

## 2021-10-01 NOTE — TELEPHONE ENCOUNTER
PATIENT CALLED AND STATES SHE RECEIVED A TEXT FROM [employer] THAT SHE DOES NOT HAVE REFILLS FOR MEDICATION  OLMESARTAN MEDOXOMIL 20 MG  THIS IS GENERIC FOR BENICAR     SHE HAS ONLY ONE TABLET LEFT    SHE SPOKE TO ANKIT ABOUT THIS YESTERDAY. SHE WAS SEEN 8/25/21    SHE STATES ELIZABETH USUALLY TAKES CARE OF HER MEDICATIONS.    CALL BACK NUMBER [phone]    [id number] [place] [address] AT [place] [address] [address] - [phone] [facility] [phone]   [phone]

## 2021-10-04 RX ORDER — OLMESARTAN MEDOXOMIL 20 MG/1
20 TABLET ORAL DAILY
Qty: 30 TABLET | Refills: 11 | Status: SHIPPED | OUTPATIENT
Start: 2021-10-04 | End: 2022-09-28 | Stop reason: SDUPTHER

## 2021-10-06 ENCOUNTER — PRIOR AUTHORIZATION (OUTPATIENT)
Dept: ENDOCRINOLOGY | Age: 66
End: 2021-10-06

## 2021-10-06 ENCOUNTER — TELEPHONE (OUTPATIENT)
Dept: ENDOCRINOLOGY | Age: 66
End: 2021-10-06

## 2021-10-18 ENCOUNTER — OFFICE VISIT (OUTPATIENT)
Dept: ENDOCRINOLOGY | Age: 66
End: 2021-10-18

## 2021-10-18 ENCOUNTER — TELEPHONE (OUTPATIENT)
Dept: ENDOCRINOLOGY | Age: 66
End: 2021-10-18

## 2021-10-18 VITALS
RESPIRATION RATE: 20 BRPM | OXYGEN SATURATION: 97 % | SYSTOLIC BLOOD PRESSURE: 124 MMHG | BODY MASS INDEX: 25.48 KG/M2 | DIASTOLIC BLOOD PRESSURE: 70 MMHG | WEIGHT: 143.8 LBS | HEIGHT: 63 IN | HEART RATE: 78 BPM

## 2021-10-18 DIAGNOSIS — E78.5 HYPERLIPIDEMIA, UNSPECIFIED HYPERLIPIDEMIA TYPE: ICD-10-CM

## 2021-10-18 DIAGNOSIS — IMO0002 DIABETES MELLITUS TYPE 2, UNCONTROLLED, WITH COMPLICATIONS: Primary | ICD-10-CM

## 2021-10-18 DIAGNOSIS — E55.9 VITAMIN D DEFICIENCY: ICD-10-CM

## 2021-10-18 PROCEDURE — 99214 OFFICE O/P EST MOD 30 MIN: CPT | Performed by: NURSE PRACTITIONER

## 2021-10-18 RX ORDER — EZETIMIBE 10 MG/1
10 TABLET ORAL DAILY
Qty: 30 TABLET | Refills: 11 | Status: SHIPPED | OUTPATIENT
Start: 2021-10-18 | End: 2022-02-23 | Stop reason: SINTOL

## 2021-10-18 RX ORDER — FLUCONAZOLE 100 MG/1
TABLET ORAL
Qty: 3 TABLET | Refills: 0 | Status: SHIPPED | OUTPATIENT
Start: 2021-10-18 | End: 2022-02-23

## 2021-10-18 RX ORDER — DAPAGLIFLOZIN 10 MG/1
10 TABLET, FILM COATED ORAL DAILY
Qty: 30 TABLET | Refills: 5 | Status: SHIPPED | OUTPATIENT
Start: 2021-10-18 | End: 2021-10-18

## 2021-10-18 NOTE — TELEPHONE ENCOUNTER
Pt called in insurance company will not cover Farxiga.  Please call her back at [phone], with another script that insurance will cover.    She uses [facility] in [place]

## 2021-10-18 NOTE — PROGRESS NOTES
"Chief Complaint  Chief Complaint   Patient presents with   • Diabetes     fup diabetes dm2 checks bs weekly last eye exam 2018       Subjective          History of Present Illness    Paz Perera 66 y.o. presents for a follow-up evaluation for type 2 DM    She has been diabetic since 2017    Pt is on the following medications for their DM: Invokana 300 mg daily (samples), Actos 15 mg daily, and Onglyza 5 mg daily    Metformin caused diarrhea and upset stomach    Insurance will not pay for Invokana.  Insurance wouldn't pay for Januvia so Dr. Grayson changed to Onglyza.      Pt complains of feeling tired, but also states that she hasn't been sleeping well.    Denies diarrhea or constipation, difficulty breathing,chest pain, palpitations, vision changes, numbness and tingling in feet/hands.    Weight 8 lb weight gain since Feb 2021    Last eye exam was 3 yrs ago - states that she is gong to make an appointment    Pt does not have a history of nephropathy.  Patient is currently taking ARB    Pt does not have neuropathy.     Pt does not have a history of CAD or CVA.    Last A1C in 08/21 was 7.70    Last microalbumin in 02/2 was normal       Blood Sugars    Blood glucoses are checked periodically.    Fasting blood glucoses: 140s    Pt has no episodes of hypoglycemia.    Patient is not physical active due to bowel issues.  Tries to watch diet, but states that she \"turns to sweets for comfort food\"          Hyperlipidemia     Pt denies any muscle/body aches, chest pain, or shortness of breath    Pt is currently not taking anything.    Lipitor caused myalgia and she has refused statin therapy in the past.    Last lipid panel in 08/21 showed Total 257, Triglycerides 156, HDL 60 and           Vitamin D Deficiency    Current treatment includes 2,000 units daily    Last Vit D level was 78.8 in 08/21             I have reviewed the patient's allergies, medicines, past medical hx, family hx and social hx.    Objective " "  Vital Signs:   /70 (BP Location: Left arm, Patient Position: Sitting, Cuff Size: Adult)   Pulse 78   Resp 20   Ht 160 cm (63\")   Wt 65.2 kg (143 lb 12.8 oz)   SpO2 97%   BMI 25.47 kg/m²       Physical Exam   Physical Exam  Constitutional:       General: She is not in acute distress.     Appearance: Normal appearance. She is not diaphoretic.   HENT:      Head: Normocephalic and atraumatic.   Eyes:      General:         Right eye: No discharge.         Left eye: No discharge.   Cardiovascular:      Rate and Rhythm: Normal rate and regular rhythm.      Heart sounds: Normal heart sounds. No murmur heard.  No friction rub. No gallop.    Pulmonary:      Effort: Pulmonary effort is normal. No respiratory distress.      Breath sounds: Normal breath sounds. No wheezing.   Skin:     General: Skin is warm and dry.   Neurological:      Mental Status: She is alert.   Psychiatric:         Mood and Affect: Mood normal.         Behavior: Behavior normal.                    Results Review:   Hemoglobin A1C   Date Value Ref Range Status   08/25/2021 7.70 (H) 4.80 - 5.60 % Final     Total Cholesterol   Date Value Ref Range Status   08/25/2021 257 (H) 0 - 200 mg/dL Final     Triglycerides   Date Value Ref Range Status   08/25/2021 156 (H) 0 - 150 mg/dL Final     HDL Cholesterol   Date Value Ref Range Status   08/25/2021 60 40 - 60 mg/dL Final     LDL Cholesterol    Date Value Ref Range Status   08/25/2021 169 (H) 0 - 100 mg/dL Final     LDL Chol Calc (NIH)   Date Value Ref Range Status   02/25/2021 166 (H) 0 - 99 mg/dL Final     VLDL Cholesterol   Date Value Ref Range Status   08/25/2021 28 5 - 40 mg/dL Final     VLDL Cholesterol Manoj   Date Value Ref Range Status   02/25/2021 34 5 - 40 mg/dL Final     LDL/HDL Ratio   Date Value Ref Range Status   08/25/2021 2.76  Final         Assessment and Plan {CC Problem List  Visit Diagnosis  ROS  Review (Popup)  Health Maintenance  Quality  BestPractice  Medications  " [facility]  SnapShot Encounters  Media :23  Diagnoses and all orders for this visit:    1. Diabetes mellitus type 2, uncontrolled, with complications (HCC) (Primary)  -     Dapagliflozin Propanediol (Farxiga) 10 MG tablet; Take 10 mg by mouth Daily.  Dispense: 30 tablet; Refill: 5  -     fluconazole (Diflucan) 100 MG tablet; Take one tab as needed for candida infection due to invokana.  Dispense: 3 tablet; Refill: 0    Continue with Actos and Onglyza  Change Invokana to fargixa due to insurance  Monitor diet closely  Recheck A1C in 4 months       2. Hyperlipidemia, unspecified hyperlipidemia type  -     ezetimibe (Zetia) 10 MG tablet; Take 1 tablet by mouth Daily.  Dispense: 30 tablet; Refill: 11    Will not take statin but is agreeable to try zetia  Advised that it is less likely to cause muscle aches/cramps, but it still can.  If they occur then advised to stop      3. Vitamin D deficiency    Levels are within normal limit  Continue with current regimen           Refills sent to pharmacy      RTC in 4 months      Follow Up     Patient was given instructions and counseling regarding her condition or for health maintenance advice. Please see specific information pulled into the AVS if appropriate.              Shruthi Grayson, APRN  10/18/21

## 2021-10-20 ENCOUNTER — TELEPHONE (OUTPATIENT)
Dept: ENDOCRINOLOGY | Age: 66
End: 2021-10-20

## 2021-10-20 DIAGNOSIS — IMO0002 DIABETES MELLITUS TYPE 2, UNCONTROLLED, WITH COMPLICATIONS: ICD-10-CM

## 2021-10-20 NOTE — TELEPHONE ENCOUNTER
Patient left a voicemail    She said Shruthi sent an order for Farxiga and her insurance would not cover it. She also tried Jardiance but she could not fill it through [facility].    Can you call her back and advise?

## 2021-10-25 ENCOUNTER — TELEPHONE (OUTPATIENT)
Dept: ENDOCRINOLOGY | Age: 66
End: 2021-10-25

## 2021-10-25 NOTE — TELEPHONE ENCOUNTER
Paz called you need to call [facility] in [place] they need a Prio Auth for sveta    Please call pt back

## 2022-02-23 ENCOUNTER — OFFICE VISIT (OUTPATIENT)
Dept: ENDOCRINOLOGY | Age: 67
End: 2022-02-23

## 2022-02-23 VITALS
OXYGEN SATURATION: 97 % | HEIGHT: 63 IN | WEIGHT: 146.6 LBS | BODY MASS INDEX: 25.98 KG/M2 | DIASTOLIC BLOOD PRESSURE: 80 MMHG | SYSTOLIC BLOOD PRESSURE: 123 MMHG | HEART RATE: 79 BPM

## 2022-02-23 DIAGNOSIS — IMO0002 DIABETES MELLITUS TYPE 2, UNCONTROLLED, WITH COMPLICATIONS: Primary | ICD-10-CM

## 2022-02-23 DIAGNOSIS — E55.9 VITAMIN D DEFICIENCY: ICD-10-CM

## 2022-02-23 DIAGNOSIS — E78.5 HYPERLIPIDEMIA, UNSPECIFIED HYPERLIPIDEMIA TYPE: ICD-10-CM

## 2022-02-23 PROCEDURE — 99214 OFFICE O/P EST MOD 30 MIN: CPT | Performed by: NURSE PRACTITIONER

## 2022-02-23 RX ORDER — SEMAGLUTIDE 1.34 MG/ML
INJECTION, SOLUTION SUBCUTANEOUS
Qty: 4.5 ML | Refills: 1 | Status: SHIPPED | OUTPATIENT
Start: 2022-02-23 | End: 2022-06-23

## 2022-02-23 NOTE — PROGRESS NOTES
"Chief Complaint  Chief Complaint   Patient presents with   • Diabetes       Subjective          History of Present Illness    Paz Perera 66 y.o.  presents for a follow-up evaluation for type 2 DM     She has been diabetic since 2017     Pt is on the following medications for their DM: Jardiance 25 mg daily, Actos 15 mg daily, and Onglyza 5 mg daily     Metformin caused diarrhea and upset stomach     Insurance will not pay for Invokana; they will pay only if she has greater than 300 microabuminuria or documentation if CKD.  Insurance wouldn't pay for Januvia so Dr. Grayson changed to Onglyza.       Denies diarrhea, constipation, difficulty breathing,chest pain, shortness of breath, vision changes, numbness and tingling in feet/hands.    Weight gain of 3 lbs since last visit.    Last eye exam was 3 yrs ago - states that she is gong to make an appointment     Pt does not have a history of nephropathy.  Patient is currently taking ARB     Pt does not have neuropathy.      Pt does not have a history of CAD or CVA.    Last A1C in 08/21 was 7.70     Last microalbumin in 02/2 was normal         Blood Sugars    Blood glucoses are checked periodically.    Random blood glucoses: 250    Pt has no episodes of hypoglycemia.          Hyperlipidemia     Pt denies any muscle/body aches, chest pain, or shortness of breath    Pt is currently not taking anything     Lipitor caused myalgia and she has refused statin therapy in the past.  Zetia caused mucle pain    Last lipid panel in 08/21 showed Total 257, Triglycerides 156, HDL 60 and           Vitamin D Deficiency     Current treatment includes 2,000 units daily     Last Vit D level was 78.8 in 08/21             I have reviewed the patient's allergies, medicines, past medical hx, family hx and social hx.    Objective   Vital Signs:   /80   Pulse 79   Ht 160 cm (62.99\")   Wt 66.5 kg (146 lb 9.6 oz)   SpO2 97%   BMI 25.98 kg/m²       Physical Exam   Physical " Exam  Constitutional:       General: She is not in acute distress.     Appearance: Normal appearance. She is not diaphoretic.   HENT:      Head: Normocephalic and atraumatic.   Eyes:      General:         Right eye: No discharge.         Left eye: No discharge.   Cardiovascular:      Rate and Rhythm: Normal rate and regular rhythm.      Heart sounds: Normal heart sounds. No murmur heard.  No friction rub. No gallop.    Pulmonary:      Effort: Pulmonary effort is normal. No respiratory distress.      Breath sounds: Normal breath sounds. No wheezing.   Skin:     General: Skin is warm and dry.   Neurological:      Mental Status: She is alert.   Psychiatric:         Mood and Affect: Mood normal.         Behavior: Behavior normal.                    Results Review:   Hemoglobin A1C   Date Value Ref Range Status   08/25/2021 7.70 (H) 4.80 - 5.60 % Final     Total Cholesterol   Date Value Ref Range Status   08/25/2021 257 (H) 0 - 200 mg/dL Final     Triglycerides   Date Value Ref Range Status   08/25/2021 156 (H) 0 - 150 mg/dL Final     HDL Cholesterol   Date Value Ref Range Status   08/25/2021 60 40 - 60 mg/dL Final     LDL Cholesterol    Date Value Ref Range Status   08/25/2021 169 (H) 0 - 100 mg/dL Final     LDL Chol Calc (NIH)   Date Value Ref Range Status   02/25/2021 166 (H) 0 - 99 mg/dL Final     VLDL Cholesterol   Date Value Ref Range Status   08/25/2021 28 5 - 40 mg/dL Final     VLDL Cholesterol Manoj   Date Value Ref Range Status   02/25/2021 34 5 - 40 mg/dL Final     LDL/HDL Ratio   Date Value Ref Range Status   08/25/2021 2.76  Final         Assessment and Plan {CC Problem List  Visit Diagnosis  ROS  Review (Popup)  Health Maintenance  Quality  BestPractice  Medications  SmartSets  SnapShot Encounters  Media :23  Diagnoses and all orders for this visit:    1. Diabetes mellitus type 2, uncontrolled, with complications (HCC) (Primary)  -     Semaglutide,0.25 or 0.5MG/DOS, (Ozempic, 0.25 or 0.5 MG/DOSE,)  2 MG/1.5ML solution pen-injector; Inject 0.25 mg weekly for two weeks, then increase to 0.5 mg weekly  Dispense: 4.5 mL; Refill: 1  -     Hemoglobin A1c  -     Comprehensive Metabolic Panel  -     Microalbumin / Creatinine Urine Ratio - Urine, Clean Catch    Check labs today  Pt states that her BGs when she checks have been in the 250s which means A1C is going to be higher  Continue with Jardiance 25 mg daily and Actos 15 mg daily  Start Ozempic - pt is adopted so no family history, but no personal history of thyroid cancer nor pancreatitis- once she starts Ozempic she is to stop Onglyza 5 mg daily       2. Hyperlipidemia, unspecified hyperlipidemia type  -     Comprehensive Metabolic Panel  -     Lipid Panel    Check labs today  Didn't tolerate zetia therefore she came off      3. Vitamin D deficiency  -     Vitamin D 25 Hydroxy    Check labs today  Continue with supplement         No refills needed at this time      RTC in 4 months with me and 8 months with Dr. Martinez      Follow Up     Patient was given instructions and counseling regarding her condition or for health maintenance advice. Please see specific information pulled into the AVS if appropriate.              Shruthi Grayson, APRN  02/23/22

## 2022-02-24 ENCOUNTER — TELEPHONE (OUTPATIENT)
Dept: ENDOCRINOLOGY | Age: 67
End: 2022-02-24

## 2022-02-24 DIAGNOSIS — E78.5 HYPERLIPIDEMIA, UNSPECIFIED HYPERLIPIDEMIA TYPE: Primary | ICD-10-CM

## 2022-02-24 DIAGNOSIS — E83.52 HYPERCALCEMIA: ICD-10-CM

## 2022-02-24 LAB
25(OH)D3+25(OH)D2 SERPL-MCNC: 75 NG/ML (ref 30–100)
ALBUMIN SERPL-MCNC: 4.5 G/DL (ref 3.8–4.8)
ALBUMIN/CREAT UR: <11 MG/G CREAT (ref 0–29)
ALBUMIN/GLOB SERPL: 1.8 {RATIO} (ref 1.2–2.2)
ALP SERPL-CCNC: 134 IU/L (ref 44–121)
ALT SERPL-CCNC: 19 IU/L (ref 0–32)
AST SERPL-CCNC: 18 IU/L (ref 0–40)
BILIRUB SERPL-MCNC: 0.2 MG/DL (ref 0–1.2)
BUN SERPL-MCNC: 28 MG/DL (ref 8–27)
BUN/CREAT SERPL: 26 (ref 12–28)
CALCIUM SERPL-MCNC: 10.5 MG/DL (ref 8.7–10.3)
CHLORIDE SERPL-SCNC: 98 MMOL/L (ref 96–106)
CHOLEST SERPL-MCNC: 294 MG/DL (ref 100–199)
CO2 SERPL-SCNC: 24 MMOL/L (ref 20–29)
CREAT SERPL-MCNC: 1.09 MG/DL (ref 0.57–1)
CREAT UR-MCNC: 27 MG/DL
GLOBULIN SER CALC-MCNC: 2.5 G/DL (ref 1.5–4.5)
GLUCOSE SERPL-MCNC: 321 MG/DL (ref 65–99)
HBA1C MFR BLD: 10.9 % (ref 4.8–5.6)
HDLC SERPL-MCNC: 48 MG/DL
IMP & REVIEW OF LAB RESULTS: NORMAL
LDLC SERPL CALC-MCNC: 185 MG/DL (ref 0–99)
MICROALBUMIN UR-MCNC: <3 UG/ML
POTASSIUM SERPL-SCNC: 4.8 MMOL/L (ref 3.5–5.2)
PROT SERPL-MCNC: 7 G/DL (ref 6–8.5)
REPORT: NORMAL
SODIUM SERPL-SCNC: 139 MMOL/L (ref 134–144)
TRIGL SERPL-MCNC: 314 MG/DL (ref 0–149)
VLDLC SERPL CALC-MCNC: 61 MG/DL (ref 5–40)

## 2022-02-24 RX ORDER — EVOLOCUMAB 140 MG/ML
140 INJECTION, SOLUTION SUBCUTANEOUS
Qty: 2 ML | Refills: 5 | Status: SHIPPED | OUTPATIENT
Start: 2022-02-24 | End: 2022-07-19 | Stop reason: SDUPTHER

## 2022-02-24 NOTE — PROGRESS NOTES
Called and talked to patient-    A1C is up to 10.9%.  Continue with changes made at visit.    Kidney function is down and calcium is elevated.  Patient states that she doesn't take any calcium supplement nor does she intake a lot of dairy products. Not on thiazide. Vitamin D levels are normal. It has been up and down throughout the years.  Will recheck labs in a couple of weeks.    Cholesterol is even higher.  She has not tolerated statin therapy nor zetia in the past.  Will try for PSK9 inhibitor.    Urine negative for protein.

## 2022-02-25 ENCOUNTER — TELEPHONE (OUTPATIENT)
Dept: ENDOCRINOLOGY | Age: 67
End: 2022-02-25

## 2022-02-25 ENCOUNTER — PRIOR AUTHORIZATION (OUTPATIENT)
Dept: ENDOCRINOLOGY | Age: 67
End: 2022-02-25

## 2022-02-25 NOTE — TELEPHONE ENCOUNTER
Patient called she will need a Prior Auth for Jardiance 25MG,      Send script when prior auth is received to:  [facility] [place].

## 2022-02-28 ENCOUNTER — TELEPHONE (OUTPATIENT)
Dept: ENDOCRINOLOGY | Age: 67
End: 2022-02-28

## 2022-03-02 ENCOUNTER — TELEPHONE (OUTPATIENT)
Dept: ENDOCRINOLOGY | Age: 67
End: 2022-03-02

## 2022-03-03 DIAGNOSIS — IMO0002 DIABETES MELLITUS TYPE 2, UNCONTROLLED, WITH COMPLICATIONS: Primary | ICD-10-CM

## 2022-03-03 RX ORDER — DAPAGLIFLOZIN 10 MG/1
10 TABLET, FILM COATED ORAL DAILY
Qty: 90 TABLET | Refills: 1 | Status: SHIPPED | OUTPATIENT
Start: 2022-03-03 | End: 2022-06-23 | Stop reason: CLARIF

## 2022-03-17 DIAGNOSIS — E83.52 HYPERCALCEMIA: Primary | ICD-10-CM

## 2022-03-31 NOTE — TELEPHONE ENCOUNTER
Spoke with patient to let her know that we have a new APRN that is coming to the office in Oct2021 and we will schedule her with the new aprn  Patient voice understanding      
No

## 2022-04-14 ENCOUNTER — TELEPHONE (OUTPATIENT)
Dept: ENDOCRINOLOGY | Age: 67
End: 2022-04-14

## 2022-04-14 RX ORDER — PIOGLITAZONEHYDROCHLORIDE 15 MG/1
15 TABLET ORAL DAILY
Qty: 90 TABLET | Refills: 1 | Status: SHIPPED | OUTPATIENT
Start: 2022-04-14 | End: 2022-06-23 | Stop reason: DRUGHIGH

## 2022-05-16 RX ORDER — SPIRONOLACTONE 25 MG/1
25 TABLET ORAL DAILY
Qty: 90 TABLET | Refills: 3 | Status: SHIPPED | OUTPATIENT
Start: 2022-05-16 | End: 2023-03-14 | Stop reason: SDUPTHER

## 2022-06-21 PROBLEM — E83.52 HYPERCALCEMIA: Status: ACTIVE | Noted: 2022-06-21

## 2022-06-21 PROBLEM — E11.22 TYPE 2 DIABETES MELLITUS WITH STAGE 3A CHRONIC KIDNEY DISEASE, WITHOUT LONG-TERM CURRENT USE OF INSULIN (HCC): Status: ACTIVE | Noted: 2021-02-25

## 2022-06-21 PROBLEM — N18.31 TYPE 2 DIABETES MELLITUS WITH STAGE 3A CHRONIC KIDNEY DISEASE, WITHOUT LONG-TERM CURRENT USE OF INSULIN: Status: ACTIVE | Noted: 2021-02-25

## 2022-06-21 NOTE — PROGRESS NOTES
Chief Complaint  Chief Complaint   Patient presents with   • Diabetes     Type 2       Subjective          History of Present Illness    Paz Perera 67 y.o.  presents for a follow-up evaluation for type 2 DM     She has been diabetic since 2017     Pt is on the following medications for their DM: Jardiance 25 mg daily, Actos 15 mg daily and Onglyza 5 mg daily      Metformin caused diarrhea and upset stomach    Ozempic 0.5 mg weekly and Farxiga 10 mg daily - insurance wouldn't cover.   Insurance will not pay for Invokana; they will pay only if she has greater than 300 microabuminuria or documentation if CKD.  Insurance wouldn't pay for Januvia so Dr. Grayson changed to Onglyza.         Pt complains of constipation, diarrhea, chest pain, shortness of breath, numbness and tingling in feet/hands, vision changes.    Denies diarrhea, constipation, chest pain, shortness of breath, vision changes, numbness and tingling in feet/hands.    Weight loss of 2 lbs since last visit.    Pt does not have a history of DM retinopathy.  Last eye exam was 06/20/22    Pt does have a history of nephropathy, CKD Stage 3a.  Patient is currently taking ARB     Pt does not have neuropathy.      Pt does not have a history of CAD or CVA.    Last A1C in 02/22 was 10.9    Last microalbumin in 02/22 was negative          Blood Sugars    Blood glucoses are checked periodically.    Fasting blood glucoses: 200-300    Pt has no episodes of hypoglycemia.            Hyperlipidemia     Pt denies any muscle/body aches, chest pain, or shortness of breath    Pt is currently  not taking anything  Lipitor caused myalgia and she has refused statin therapy in the past.  Zetia caused mucle pain  PA denied for both PCSK9 inhibitors    Last lipid panel in 02/22 showed Total 294, Triglycerides 314, HDL 48 and             Vitamin D Deficiency    Current treatment includes 2,000 units daily    Last Vit D level was 75.0 in 02/22  "            Hypercalcemia    Calcium has been elevated on and off throughout the years, with last calcium of 10.5 in 03/22.  Pt states that she doesn't take a supplement and itsn't on thiazide.  Vitamin D levels are normal.  PTH was normal at 25, as was ionized calcium at 5.5 in 03/22.  Pt was asked to complete a 24 hour urine, but she never completed it.          I have reviewed the patient's allergies, medicines, past medical hx, family hx and social hx.    Objective   Vital Signs:   /70   Pulse 80   Temp 98.5 °F (36.9 °C)   Ht 160 cm (62.99\")   Wt 65.7 kg (144 lb 12.8 oz)   SpO2 98%   BMI 25.66 kg/m²       Physical Exam   Physical Exam  Constitutional:       General: She is not in acute distress.     Appearance: Normal appearance. She is not diaphoretic.   HENT:      Head: Normocephalic and atraumatic.   Eyes:      General:         Right eye: No discharge.         Left eye: No discharge.   Skin:     General: Skin is warm and dry.   Neurological:      Mental Status: She is alert.   Psychiatric:         Mood and Affect: Mood normal.         Behavior: Behavior normal.                    Results Review:   Hemoglobin A1C   Date Value Ref Range Status   02/23/2022 10.9 (H) 4.8 - 5.6 % Final     Comment:              Prediabetes: 5.7 - 6.4           Diabetes: >6.4           Glycemic control for adults with diabetes: <7.0     08/25/2021 7.70 (H) 4.80 - 5.60 % Final     Total Cholesterol   Date Value Ref Range Status   08/25/2021 257 (H) 0 - 200 mg/dL Final     Triglycerides   Date Value Ref Range Status   02/23/2022 314 (H) 0 - 149 mg/dL Final   08/25/2021 156 (H) 0 - 150 mg/dL Final     HDL Cholesterol   Date Value Ref Range Status   02/23/2022 48 >39 mg/dL Final   08/25/2021 60 40 - 60 mg/dL Final     LDL Chol Calc (NIH)   Date Value Ref Range Status   02/23/2022 185 (H) 0 - 99 mg/dL Final     VLDL Cholesterol Manoj   Date Value Ref Range Status   02/23/2022 61 (H) 5 - 40 mg/dL Final     LDL/HDL Ratio   Date " Value Ref Range Status   08/25/2021 2.76  Final         Assessment and Plan {CC Problem List  Visit Diagnosis  ROS  Review (Popup)  Health Maintenance  Quality  BestPractice  Medications  SmartSets  SnapShot Encounters  Media :23  Diagnoses and all orders for this visit:    1. Type 2 diabetes mellitus with stage 3a chronic kidney disease, without long-term current use of insulin ([facility]) (Primary)  -     insulin degludec (Tresiba FlexTouch) 100 UNIT/ML solution pen-injector injection; Inject 10 Units under the skin into the appropriate area as directed Daily.  Dispense: 9 mL; Refill: 1  -     pioglitazone (Actos) 30 MG tablet; Take 1 tablet by mouth Daily. By mouth take one tab daily.  Dispense: 90 tablet; Refill: 1  -     Hemoglobin A1c  -     Comprehensive Metabolic Panel  -     fluconazole (Diflucan) 150 MG tablet; Take 1 tablet by mouth 1 (One) Time for 1 dose. Take one tablet and if symptoms not resolved then take another in 72 hours after first  Dispense: 1 tablet; Refill: 0    Start Long acting insulin 10 units once a day  Check blood glucose daily in the morning  Increase Actos to 30 mg daily  Insurance denied Ozempic, therefore she never changed over  Pt is getting new insurance starting July 1st, 2022 - after this we will try to get GLP1.  Pt wants to try to get new drug in this class, Moujaro.  Will try for it, but if insurance doesn't cover then will change to Ozempic/Trulicity.  Once starts GLP1 then stop Onglyza 5 mg daily, but for now continue  Continue with  Jardiance 25 mg daily  Check labs      2. Hyperlipidemia, unspecified hyperlipidemia type  -     Comprehensive Metabolic Panel  -     Lipid Panel    Check labs  Pt is getting new insurance seen and will retry for PCSK 9 inhibitor at that time      3. Vitamin D deficiency    Last labs showed stable levels  Continue with supplement      4. Hypercalcemia  -     Comprehensive Metabolic Panel  -     Calcium, Urine, 24 Hour - Urine, Clean  Catch; Future  -     Creatinine, Urine, 24 Hour - Urine, Clean Catch; Future  -     Phosphorus, 24 Hour Urine - Urine, Clean Catch; Future    Need to check 24 hour urine for calcium, phosphorus and creatinine          Once change to new insurance will need to sent prescriptions to [facility] on [address] in [place]        Refills sent to pharmacy    Labs today  RTC in 3 months with me and 6 months with Dr. Martinez      Follow Up     Patient was given instructions and counseling regarding her condition or for health maintenance advice. Please see specific information pulled into the AVS if appropriate.              Shruthi Grayson, LUIS  06/23/22

## 2022-06-23 ENCOUNTER — TELEPHONE (OUTPATIENT)
Dept: ENDOCRINOLOGY | Age: 67
End: 2022-06-23

## 2022-06-23 ENCOUNTER — OFFICE VISIT (OUTPATIENT)
Dept: ENDOCRINOLOGY | Age: 67
End: 2022-06-23

## 2022-06-23 VITALS
HEART RATE: 80 BPM | SYSTOLIC BLOOD PRESSURE: 122 MMHG | BODY MASS INDEX: 25.66 KG/M2 | WEIGHT: 144.8 LBS | HEIGHT: 63 IN | DIASTOLIC BLOOD PRESSURE: 70 MMHG | OXYGEN SATURATION: 98 % | TEMPERATURE: 98.5 F

## 2022-06-23 DIAGNOSIS — E11.22 TYPE 2 DIABETES MELLITUS WITH STAGE 3A CHRONIC KIDNEY DISEASE, WITHOUT LONG-TERM CURRENT USE OF INSULIN: Primary | ICD-10-CM

## 2022-06-23 DIAGNOSIS — N18.31 TYPE 2 DIABETES MELLITUS WITH STAGE 3A CHRONIC KIDNEY DISEASE, WITHOUT LONG-TERM CURRENT USE OF INSULIN: Primary | ICD-10-CM

## 2022-06-23 DIAGNOSIS — E55.9 VITAMIN D DEFICIENCY: ICD-10-CM

## 2022-06-23 DIAGNOSIS — E78.5 HYPERLIPIDEMIA, UNSPECIFIED HYPERLIPIDEMIA TYPE: ICD-10-CM

## 2022-06-23 DIAGNOSIS — E83.52 HYPERCALCEMIA: ICD-10-CM

## 2022-06-23 PROCEDURE — 99214 OFFICE O/P EST MOD 30 MIN: CPT | Performed by: NURSE PRACTITIONER

## 2022-06-23 RX ORDER — SAXAGLIPTIN 5 MG/1
5 TABLET, FILM COATED ORAL DAILY
COMMUNITY
Start: 2022-06-22 | End: 2022-06-26

## 2022-06-23 RX ORDER — PIOGLITAZONEHYDROCHLORIDE 30 MG/1
30 TABLET ORAL DAILY
Qty: 90 TABLET | Refills: 1 | Status: SHIPPED | OUTPATIENT
Start: 2022-06-23 | End: 2022-10-20 | Stop reason: SDUPTHER

## 2022-06-23 RX ORDER — INSULIN DEGLUDEC INJECTION 100 U/ML
10 INJECTION, SOLUTION SUBCUTANEOUS DAILY
Qty: 9 ML | Refills: 1 | Status: SHIPPED | OUTPATIENT
Start: 2022-06-23 | End: 2022-06-27 | Stop reason: CLARIF

## 2022-06-23 RX ORDER — FLUCONAZOLE 150 MG/1
150 TABLET ORAL ONCE
Qty: 1 TABLET | Refills: 0 | Status: SHIPPED | OUTPATIENT
Start: 2022-06-23 | End: 2022-06-23

## 2022-06-23 RX ORDER — EMPAGLIFLOZIN 25 MG/1
25 TABLET, FILM COATED ORAL DAILY
COMMUNITY
Start: 2022-05-17 | End: 2022-07-12 | Stop reason: SDUPTHER

## 2022-06-23 NOTE — TELEPHONE ENCOUNTER
623 called and lm ins can trade out with other options on rx    Subjective   Rachelle Corbin is a 56 y.o. female.     Upper respiratory infection and hives.  She developed congestion and drainage about 5 days ago.  So slowly has developed hives which fluctuated in intensity.  They tend to get worse in the evening.  She has developed worsening frontal discomfort and is getting frequent severe headaches.  She is a bad headache person.  It sounds like she's never been officially diagnosed with migraines.  Aching and pounding frontal discomfort that at times extends back to the parietal area on the right.  She freely gets good response to Excedrin migraine but hasn't been able to get any.  No fever.  She's not coughing.         The following portions of the patient's history were reviewed and updated as appropriate: allergies, current medications, past family history, past medical history, past social history, past surgical history and problem list.    Review of Systems   Constitutional: Negative for chills and fever.   HENT: Positive for congestion, postnasal drip, rhinorrhea, sinus pressure and sinus pain. Negative for sore throat.    Eyes: Negative for discharge and visual disturbance.   Respiratory: Negative for cough and shortness of breath.    Cardiovascular: Negative for chest pain.   Gastrointestinal: Negative for abdominal pain, constipation, diarrhea, nausea and vomiting.   Endocrine: Negative for polydipsia.   Genitourinary: Negative for dysuria and hematuria.   Musculoskeletal: Negative for arthralgias and myalgias.   Skin: Negative for rash.   Allergic/Immunologic: Negative.    Neurological: Positive for headaches. Negative for weakness and numbness.   Hematological: Does not bruise/bleed easily.   Psychiatric/Behavioral: Negative for dysphoric mood. The patient is not nervous/anxious.    All other systems reviewed and are negative.      Objective   Physical Exam   Constitutional: She is oriented to person, place, and time. She appears well-developed and well-nourished.   Non-toxic appearance. She has a sickly appearance. No distress.   HENT:   Head: Normocephalic and atraumatic.   Right Ear: Tympanic membrane and external ear normal.   Left Ear: Tympanic membrane and external ear normal.   Nose: Mucosal edema and rhinorrhea present.   Mouth/Throat: Uvula is midline, oropharynx is clear and moist and mucous membranes are normal. No oropharyngeal exudate.   She sounds congested.   Eyes: Conjunctivae, EOM and lids are normal. Pupils are equal, round, and reactive to light. Right eye exhibits no discharge. Left eye exhibits no discharge. No scleral icterus.   Neck: Trachea normal, normal range of motion and phonation normal. Neck supple. No thyromegaly present.   Cardiovascular: Normal rate, regular rhythm and normal heart sounds. Exam reveals no gallop and no friction rub.   No murmur heard.  Pulmonary/Chest: Effort normal and breath sounds normal. No stridor. She has no wheezes. She has no rales.   Abdominal: Soft. She exhibits no distension. There is no tenderness.   Musculoskeletal: Normal range of motion. She exhibits no edema.   Lymphadenopathy:     She has no cervical adenopathy.   Neurological: She is alert and oriented to person, place, and time. She has normal strength. No cranial nerve deficit or sensory deficit. Coordination and gait normal.   Skin: Skin is warm, dry and intact. No petechiae and no rash noted. No cyanosis. Nails show no clubbing.   Psychiatric: She has a normal mood and affect. Her speech is normal and behavior is normal. Judgment and thought content normal. Cognition and memory are normal.   Nursing note and vitals reviewed.      Assessment/Plan   Rachelle was seen today for itching and headache.    Diagnoses and all orders for this visit:    Viral URI    Urticaria  -     hydrOXYzine (ATARAX) 10 MG tablet; 1-3 tabs PO Q6H prn itching      Patient Instructions   Vaporizer/humidifier to provide enough humidity in sleeping room that you can awaken in the morning  without having dry mouth or nose.  Continue this until the air conditioning comes on in the spring.  Will need to keep clean to avoid mold.  Ultrasonic cool mist vaporizers are very effective and inexpensive.    You may use EITHER OTC Dimetapp Childrens Cold & Allergy (for drainage and congestion) OR Dimetapp Childrens Cold & Cough (for drainage, congestion and cough).  Store/generic brand is as good as brand name product.     Recheck for persistence or for worsening, especially after initial improvement     Use the Excedrin Migraine supplemented with ibuprofen 600 mg (3 of the OTC tabs) every 6 hours    For itching, use EITHER Claritin/loratadine OR Zyrtec/cetirizine daily for next week PLUS Zantac/ranitidine 150 mg twice daily for next week.  If needed, add rx antihistamine.    Moisturize with Lubriderm or Eucerin              EMR Dragon/Transcription disclaimer:   Much of this encounter note is an electronic transcription/translation of spoken language to printed text. The electronic translation of spoken language may permit erroneous, or at times, nonsensical words or phrases to be inadvertently transcribed; Although I have reviewed the note for such errors, some may still exist. Please contact me with any questions or concerns about the conduct of this encounter note.

## 2022-06-23 NOTE — PATIENT INSTRUCTIONS
Start Long acting insulin 10 units once a day  Check blood glucose daily in the morning  Increase Actos to 30 mg daily

## 2022-06-23 NOTE — TELEPHONE ENCOUNTER
PATIENT CALLED L/M THAT YOU WILL NEED TO START A PRIOR AUTH FOR THE TRESIBA THAT WAS SENT TO [facility].      CALL PATIENT WITH ANY QUESTIONS.

## 2022-06-24 LAB
ALBUMIN SERPL-MCNC: 4.6 G/DL (ref 3.8–4.8)
ALBUMIN/GLOB SERPL: 2 {RATIO} (ref 1.2–2.2)
ALP SERPL-CCNC: 122 IU/L (ref 44–121)
ALT SERPL-CCNC: 21 IU/L (ref 0–32)
AST SERPL-CCNC: 15 IU/L (ref 0–40)
BILIRUB SERPL-MCNC: 0.2 MG/DL (ref 0–1.2)
BUN SERPL-MCNC: 29 MG/DL (ref 8–27)
BUN/CREAT SERPL: 23 (ref 12–28)
CALCIUM SERPL-MCNC: 10.4 MG/DL (ref 8.7–10.3)
CHLORIDE SERPL-SCNC: 98 MMOL/L (ref 96–106)
CHOLEST SERPL-MCNC: 282 MG/DL (ref 100–199)
CO2 SERPL-SCNC: 25 MMOL/L (ref 20–29)
CREAT SERPL-MCNC: 1.24 MG/DL (ref 0.57–1)
EGFRCR SERPLBLD CKD-EPI 2021: 48 ML/MIN/1.73
GLOBULIN SER CALC-MCNC: 2.3 G/DL (ref 1.5–4.5)
GLUCOSE SERPL-MCNC: 243 MG/DL (ref 65–99)
HBA1C MFR BLD: 10.5 % (ref 4.8–5.6)
HDLC SERPL-MCNC: 46 MG/DL
IMP & REVIEW OF LAB RESULTS: NORMAL
LDLC SERPL CALC-MCNC: 192 MG/DL (ref 0–99)
POTASSIUM SERPL-SCNC: 4.6 MMOL/L (ref 3.5–5.2)
PROT SERPL-MCNC: 6.9 G/DL (ref 6–8.5)
REPORT: NORMAL
SODIUM SERPL-SCNC: 138 MMOL/L (ref 134–144)
TRIGL SERPL-MCNC: 227 MG/DL (ref 0–149)
VLDLC SERPL CALC-MCNC: 44 MG/DL (ref 5–40)

## 2022-06-26 RX ORDER — SAXAGLIPTIN 5 MG/1
TABLET, FILM COATED ORAL
Qty: 90 TABLET | Refills: 3 | Status: SHIPPED | OUTPATIENT
Start: 2022-06-26 | End: 2022-07-19 | Stop reason: ALTCHOICE

## 2022-06-27 ENCOUNTER — LAB (OUTPATIENT)
Dept: ENDOCRINOLOGY | Age: 67
End: 2022-06-27

## 2022-06-27 DIAGNOSIS — E83.52 HYPERCALCEMIA: ICD-10-CM

## 2022-06-27 DIAGNOSIS — N18.31 TYPE 2 DIABETES MELLITUS WITH STAGE 3A CHRONIC KIDNEY DISEASE, WITHOUT LONG-TERM CURRENT USE OF INSULIN: Primary | ICD-10-CM

## 2022-06-27 DIAGNOSIS — E11.22 TYPE 2 DIABETES MELLITUS WITH STAGE 3A CHRONIC KIDNEY DISEASE, WITHOUT LONG-TERM CURRENT USE OF INSULIN: Primary | ICD-10-CM

## 2022-06-27 RX ORDER — INSULIN GLARGINE 300 U/ML
10 INJECTION, SOLUTION SUBCUTANEOUS DAILY
Qty: 3 ML | Refills: 1 | Status: SHIPPED | OUTPATIENT
Start: 2022-06-27 | End: 2022-12-21 | Stop reason: ALTCHOICE

## 2022-06-28 DIAGNOSIS — E21.3 HYPERPARATHYROIDISM: Primary | ICD-10-CM

## 2022-07-12 ENCOUNTER — TELEPHONE (OUTPATIENT)
Dept: ENDOCRINOLOGY | Age: 67
End: 2022-07-12

## 2022-07-12 RX ORDER — EMPAGLIFLOZIN 25 MG/1
25 TABLET, FILM COATED ORAL DAILY
Qty: 30 TABLET | Refills: 5 | Status: SHIPPED | OUTPATIENT
Start: 2022-07-12 | End: 2023-02-09 | Stop reason: SDUPTHER

## 2022-07-12 NOTE — TELEPHONE ENCOUNTER
PATIENT CALLED NEEDS A REFILL ON     Jardiance 25 MG tablet tablet SEND TO [facility] [place].    CALL PATIENT WITH QUESTION -[phone]

## 2022-07-15 ENCOUNTER — TELEPHONE (OUTPATIENT)
Dept: ENDOCRINOLOGY | Age: 67
End: 2022-07-15

## 2022-07-15 NOTE — TELEPHONE ENCOUNTER
PATIENT CALLED PLEASE SEND Jardiance 25 MG tablet tablet      SEND TO [address] [address] [phone]

## 2022-07-19 DIAGNOSIS — E78.5 HYPERLIPIDEMIA, UNSPECIFIED HYPERLIPIDEMIA TYPE: ICD-10-CM

## 2022-07-19 DIAGNOSIS — E11.22 TYPE 2 DIABETES MELLITUS WITH STAGE 3A CHRONIC KIDNEY DISEASE, WITHOUT LONG-TERM CURRENT USE OF INSULIN: Primary | ICD-10-CM

## 2022-07-19 DIAGNOSIS — N18.31 TYPE 2 DIABETES MELLITUS WITH STAGE 3A CHRONIC KIDNEY DISEASE, WITHOUT LONG-TERM CURRENT USE OF INSULIN: Primary | ICD-10-CM

## 2022-07-19 RX ORDER — SEMAGLUTIDE 1.34 MG/ML
INJECTION, SOLUTION SUBCUTANEOUS
Qty: 4.5 ML | Refills: 1 | Status: SHIPPED | OUTPATIENT
Start: 2022-07-19 | End: 2022-10-28 | Stop reason: SDUPTHER

## 2022-07-19 RX ORDER — EVOLOCUMAB 140 MG/ML
140 INJECTION, SOLUTION SUBCUTANEOUS
Qty: 2 PEN | Refills: 5 | Status: SHIPPED | OUTPATIENT
Start: 2022-07-19 | End: 2022-12-21

## 2022-07-25 ENCOUNTER — TELEPHONE (OUTPATIENT)
Dept: ENDOCRINOLOGY | Age: 67
End: 2022-07-25

## 2022-07-26 ENCOUNTER — PRIOR AUTHORIZATION (OUTPATIENT)
Dept: ENDOCRINOLOGY | Age: 67
End: 2022-07-26

## 2022-07-26 ENCOUNTER — TELEPHONE (OUTPATIENT)
Dept: ENDOCRINOLOGY | Age: 67
End: 2022-07-26

## 2022-07-26 NOTE — TELEPHONE ENCOUNTER
Prior Authorization has been started for patient medication       JARDIANCE,REPATHA,OZEMPIC,PIOGLITAZONE AND TRESIBA

## 2022-07-28 ENCOUNTER — DOCUMENTATION (OUTPATIENT)
Dept: ENDOCRINOLOGY | Age: 67
End: 2022-07-28

## 2022-07-28 ENCOUNTER — TELEPHONE (OUTPATIENT)
Dept: ENDOCRINOLOGY | Age: 67
End: 2022-07-28

## 2022-08-04 ENCOUNTER — PRIOR AUTHORIZATION (OUTPATIENT)
Dept: ENDOCRINOLOGY | Age: 67
End: 2022-08-04

## 2022-09-22 PROBLEM — E21.3 HYPERPARATHYROIDISM (HCC): Status: ACTIVE | Noted: 2022-09-22

## 2022-09-28 ENCOUNTER — TELEPHONE (OUTPATIENT)
Dept: ENDOCRINOLOGY | Age: 67
End: 2022-09-28

## 2022-09-28 RX ORDER — FLUCONAZOLE 150 MG/1
150 TABLET ORAL ONCE
Qty: 1 TABLET | Refills: 0 | Status: SHIPPED | OUTPATIENT
Start: 2022-09-28 | End: 2022-09-29 | Stop reason: SDUPTHER

## 2022-09-29 RX ORDER — OLMESARTAN MEDOXOMIL 20 MG/1
20 TABLET ORAL DAILY
Qty: 30 TABLET | Refills: 11 | Status: SHIPPED | OUTPATIENT
Start: 2022-09-29 | End: 2022-10-21

## 2022-09-29 RX ORDER — FLUCONAZOLE 150 MG/1
150 TABLET ORAL ONCE
Qty: 1 TABLET | Refills: 0 | Status: SHIPPED | OUTPATIENT
Start: 2022-09-29 | End: 2022-09-29

## 2022-09-29 RX ORDER — OLMESARTAN MEDOXOMIL 20 MG/1
20 TABLET ORAL DAILY
Qty: 30 TABLET | Refills: 11 | Status: SHIPPED | OUTPATIENT
Start: 2022-09-29 | End: 2022-09-29 | Stop reason: SDUPTHER

## 2022-09-29 NOTE — TELEPHONE ENCOUNTER
The patient said this is a mistake.  I called them into the [facility] pharmacy in [place].  Can someone contact the [facility] pharmacy and let them know

## 2022-10-01 RX ORDER — FLUCONAZOLE 150 MG/1
150 TABLET ORAL ONCE
Qty: 1 TABLET | Refills: 0 | Status: SHIPPED | OUTPATIENT
Start: 2022-10-01 | End: 2022-10-01

## 2022-10-03 ENCOUNTER — TELEPHONE (OUTPATIENT)
Dept: FAMILY MEDICINE CLINIC | Facility: CLINIC | Age: 67
End: 2022-10-03

## 2022-10-20 ENCOUNTER — OFFICE VISIT (OUTPATIENT)
Dept: ENDOCRINOLOGY | Age: 67
End: 2022-10-20

## 2022-10-20 VITALS
SYSTOLIC BLOOD PRESSURE: 116 MMHG | OXYGEN SATURATION: 99 % | TEMPERATURE: 97.3 F | HEIGHT: 63 IN | DIASTOLIC BLOOD PRESSURE: 74 MMHG | BODY MASS INDEX: 25.62 KG/M2 | WEIGHT: 144.6 LBS | HEART RATE: 94 BPM

## 2022-10-20 DIAGNOSIS — E78.5 HYPERLIPIDEMIA, UNSPECIFIED HYPERLIPIDEMIA TYPE: ICD-10-CM

## 2022-10-20 DIAGNOSIS — N18.31 TYPE 2 DIABETES MELLITUS WITH STAGE 3A CHRONIC KIDNEY DISEASE, WITHOUT LONG-TERM CURRENT USE OF INSULIN: Primary | ICD-10-CM

## 2022-10-20 DIAGNOSIS — E11.22 TYPE 2 DIABETES MELLITUS WITH STAGE 3A CHRONIC KIDNEY DISEASE, WITHOUT LONG-TERM CURRENT USE OF INSULIN: Primary | ICD-10-CM

## 2022-10-20 DIAGNOSIS — E55.9 VITAMIN D DEFICIENCY: ICD-10-CM

## 2022-10-20 DIAGNOSIS — E21.3 HYPERPARATHYROIDISM: ICD-10-CM

## 2022-10-20 PROCEDURE — 99214 OFFICE O/P EST MOD 30 MIN: CPT | Performed by: NURSE PRACTITIONER

## 2022-10-20 RX ORDER — PIOGLITAZONEHYDROCHLORIDE 30 MG/1
30 TABLET ORAL DAILY
Qty: 90 TABLET | Refills: 1 | Status: SHIPPED | OUTPATIENT
Start: 2022-10-20 | End: 2023-01-31

## 2022-10-20 NOTE — PATIENT INSTRUCTIONS
Continue with Jardiance 25 mg daily and Actos 30 mg daily  Stop Onglyza 5 mg daily and start Ozempic  Start Toujeo/Tresiba 10 units daily  Check blood sugars daily and bring log to next appointment  Start Repatha

## 2022-10-20 NOTE — PROGRESS NOTES
Chief Complaint  Chief Complaint   Patient presents with   • Diabetes     Type 2: Pt doesn't have meter, is up to date on eye exam, no hx of retinopathy or neuropathy.        Subjective          History of Present Illness    Paz Perera 67 y.o. presents for a follow-up evaluation for type 2 DM     She has been diabetic since 2017     Pt is on the following medications for their DM: Jardiance 25 mg daily, Actos 30 mg daily and Onglyza 5 mg daily    Has Ozempic at home but hasn't started  Toujeo 10 units daily (never started)        Metformin caused diarrhea and upset stomach         Denies diarrhea, constipation, chest pain, shortness of breath, vision changes or numbness and tingling in feet/hands.    Weight stable since last visit.     Pt does not have a history of DM retinopathy.  Last eye exam was 06/20/22     Pt does have a history of nephropathy, CKD Stage 3a.  Patient is currently taking ARB     Pt does not have neuropathy.      Pt does not have a history of CAD or CVA.     Last A1C in 06/22 was 10.5     Last microalbumin in 02/22 was negative           Blood Sugars     Blood glucoses are not checked               Hyperlipidemia      Pt denies any muscle/body aches, chest pain, or shortness of breath     Pt is currently taking Repatha 140 mg every 14 days - has it but hasn't started it  Lipitor caused myalgia and she has refused statin therapy in the past.  Zetia caused mucle pain     Last lipid panel in 06/22 showed Total 282, Triglycerides 227, HDL 46 and                  Vitamin D Deficiency     Current treatment includes 2,000 units daily     Last Vit D level was 75.0 in 02/22                  Hyperparathyroidism     Calcium has been elevated on and off throughout the years, with last calcium of 10.5 in 03/22.  Pt states that she doesn't take a supplement and itsn't on thiazide.  Vitamin D levels are normal.  PTH was normal at 25, as was ionized calcium at 5.5 in 03/22.     24 hour urine showed  "hyperparathyroidism     Last calcium in 06/22 was 10.4    Pt needs parathyroid scan - never heard from [facility]    DEXA scan is scheduled for 02/07/23          I have reviewed the patient's allergies, medicines, past medical hx, family hx and social hx.    Objective   Vital Signs:   /74   Pulse 94   Temp 97.3 °F (36.3 °C) (Temporal)   Ht 160 cm (62.99\")   Wt 65.6 kg (144 lb 9.6 oz)   SpO2 99%   BMI 25.62 kg/m²       Physical Exam   Physical Exam  Constitutional:       General: She is not in acute distress.     Appearance: Normal appearance. She is not diaphoretic.   HENT:      Head: Normocephalic and atraumatic.   Eyes:      General:         Right eye: No discharge.         Left eye: No discharge.   Skin:     General: Skin is warm and dry.   Neurological:      Mental Status: She is alert.   Psychiatric:         Mood and Affect: Mood normal.         Behavior: Behavior normal.                    Results Review:   Hemoglobin A1C   Date Value Ref Range Status   06/23/2022 10.5 (H) 4.8 - 5.6 % Final     Comment:              Prediabetes: 5.7 - 6.4           Diabetes: >6.4           Glycemic control for adults with diabetes: <7.0     08/25/2021 7.70 (H) 4.80 - 5.60 % Final     Total Cholesterol   Date Value Ref Range Status   08/25/2021 257 (H) 0 - 200 mg/dL Final     Triglycerides   Date Value Ref Range Status   06/23/2022 227 (H) 0 - 149 mg/dL Final   08/25/2021 156 (H) 0 - 150 mg/dL Final     HDL Cholesterol   Date Value Ref Range Status   06/23/2022 46 >39 mg/dL Final   08/25/2021 60 40 - 60 mg/dL Final     LDL Chol Calc (NIH)   Date Value Ref Range Status   06/23/2022 192 (H) 0 - 99 mg/dL Final     VLDL Cholesterol Manoj   Date Value Ref Range Status   06/23/2022 44 (H) 5 - 40 mg/dL Final     LDL/HDL Ratio   Date Value Ref Range Status   08/25/2021 2.76  Final         Assessment and Plan {CC Problem List  Visit Diagnosis  ROS  Review (Popup)  Health Maintenance  Quality  BestPractice  Medications  " SmartSets  SnapShot Encounters  Media :23  Diagnoses and all orders for this visit:    1. Type 2 diabetes mellitus with stage 3a chronic kidney disease, without long-term current use of insulin (HCC) (Primary)  -     pioglitazone (Actos) 30 MG tablet; Take 1 tablet by mouth Daily. By mouth take one tab daily.  Dispense: 90 tablet; Refill: 1  -     Hemoglobin A1c  -     Comprehensive Metabolic Panel    Pt was confused about medication since so many of them had changed with new insurance which is why she didn't start some of them.  Continue with Jardiance 25 mg daily and Actos 30 mg daily  Stop Onglyza 5 mg daily and start Ozempic  Start Toujeo 10 units daily  Check blood sugars daily and bring log to next appointment  Check labs      2. Hyperlipidemia, unspecified hyperlipidemia type  -     Comprehensive Metabolic Panel  -     Lipid Panel    Check labs today  Start Repath        3. Vitamin D deficiency  -     Lipid Panel  -     Vitamin D,25-Hydroxy    Check labs today  Continue with supplement      4. Hyperparathyroidism (HCC)  -     NM Parathyroid Scan w SPECT; Future  -     Vitamin D,25-Hydroxy    States that she never heard from [facility]  Parathyroid scan order resubmitted  Dexa in Feb 2023 scheduled        Refills sent to pharmacy      Labs today  RTC on 12/21/22 with Dr. Martinez      Follow Up     Patient was given instructions and counseling regarding her condition or for health maintenance advice. Please see specific information pulled into the AVS if appropriate.              Shruthi Grayson, APRN  10/20/22

## 2022-10-21 LAB
25(OH)D3+25(OH)D2 SERPL-MCNC: 88.8 NG/ML (ref 30–100)
ALBUMIN SERPL-MCNC: 4.5 G/DL (ref 3.5–5.2)
ALBUMIN/GLOB SERPL: 2.1 G/DL
ALP SERPL-CCNC: 127 U/L (ref 39–117)
ALT SERPL-CCNC: 19 U/L (ref 1–33)
AST SERPL-CCNC: 17 U/L (ref 1–32)
BILIRUB SERPL-MCNC: <0.2 MG/DL (ref 0–1.2)
BUN SERPL-MCNC: 21 MG/DL (ref 8–23)
BUN/CREAT SERPL: 18.3 (ref 7–25)
CALCIUM SERPL-MCNC: 10.4 MG/DL (ref 8.6–10.5)
CHLORIDE SERPL-SCNC: 94 MMOL/L (ref 98–107)
CHOLEST SERPL-MCNC: 281 MG/DL (ref 0–200)
CO2 SERPL-SCNC: 29.6 MMOL/L (ref 22–29)
CREAT SERPL-MCNC: 1.15 MG/DL (ref 0.57–1)
EGFRCR SERPLBLD CKD-EPI 2021: 53 ML/MIN/1.73
GLOBULIN SER CALC-MCNC: 2.1 GM/DL
GLUCOSE SERPL-MCNC: 294 MG/DL (ref 65–99)
HBA1C MFR BLD: 9.7 % (ref 4.8–5.6)
HDLC SERPL-MCNC: 51 MG/DL (ref 40–60)
IMP & REVIEW OF LAB RESULTS: NORMAL
LDLC SERPL CALC-MCNC: 168 MG/DL (ref 0–100)
POTASSIUM SERPL-SCNC: 4.3 MMOL/L (ref 3.5–5.2)
PROT SERPL-MCNC: 6.6 G/DL (ref 6–8.5)
SODIUM SERPL-SCNC: 136 MMOL/L (ref 136–145)
TRIGL SERPL-MCNC: 328 MG/DL (ref 0–150)
VLDLC SERPL CALC-MCNC: 62 MG/DL (ref 5–40)

## 2022-10-21 RX ORDER — OLMESARTAN MEDOXOMIL 20 MG/1
TABLET ORAL
Qty: 30 TABLET | Refills: 11 | Status: SHIPPED | OUTPATIENT
Start: 2022-10-21

## 2022-10-28 DIAGNOSIS — E11.22 TYPE 2 DIABETES MELLITUS WITH STAGE 3A CHRONIC KIDNEY DISEASE, WITHOUT LONG-TERM CURRENT USE OF INSULIN: ICD-10-CM

## 2022-10-28 DIAGNOSIS — N18.31 TYPE 2 DIABETES MELLITUS WITH STAGE 3A CHRONIC KIDNEY DISEASE, WITHOUT LONG-TERM CURRENT USE OF INSULIN: ICD-10-CM

## 2022-10-28 RX ORDER — SEMAGLUTIDE 1.34 MG/ML
INJECTION, SOLUTION SUBCUTANEOUS
Qty: 4.5 ML | Refills: 1 | Status: SHIPPED | OUTPATIENT
Start: 2022-10-28 | End: 2023-01-01 | Stop reason: DRUGHIGH

## 2022-10-28 NOTE — TELEPHONE ENCOUNTER
Rx Refill Note  Requested Prescriptions     Pending Prescriptions Disp Refills   • Semaglutide,0.25 or 0.5MG/DOS, (Ozempic, 0.25 or 0.5 MG/DOSE,) 2 MG/1.5ML solution pen-injector 4.5 mL 1     Sig: Inject 0.25 mg weekly for four weeks, then increase to 0.5 mg weekly      Last office visit with prescribing clinician: 10/20/2022      Next office visit with prescribing clinician: Visit date not found            Maribel Fernández MA  10/28/22, 14:31 EDT

## 2022-12-15 DIAGNOSIS — E78.5 HYPERLIPIDEMIA, UNSPECIFIED HYPERLIPIDEMIA TYPE: ICD-10-CM

## 2022-12-20 NOTE — PROGRESS NOTES
Royer Perera is a 67 y.o. female.     History of Present Illness  F/u for dm 2, hyperlipidemia, hyperparathyroidism / does not test bs  / last dm eye exam 22 / last dm foot exam today with dr Martinez      Patient is 67-year-old female came in for follow-up.  She is new to me.    She has known diabetes mellitus since 2017.  She is on Jardiance 25 mg once a day, Actos 30 mg once a day, and Ozempic 0.5 mg weekly.  She checks her blood sugar once a week and random glucose runs 175-185.  She denies hypoglycemia.  She has lost 7 pounds since 2022.  She had diarrhea with metformin in the past.  She was on Januvia without side effects in the past.  Her last meal was at 2 PM.    Urine microalbumin was normal in 2022.  She had an eye exam in 2022 and she has no retinopathy.  She denies numbness or tingling in her hands or feet.    She has hyperlipidemia and is on Repatha 140 mg every 2 weeks.  She had myalgia with Lipitor and Zetia in the past.    She has hypertension and is on olmesartan 20 mg/day.  She is on spironolactone 25 mg daily for acne prescribed by her PCP.  She has no history of heart attack or stroke.  She denies chest pain or shortness of breath.    She has vitamin D deficiency and is on vitamin D3 2000 units/day.    She is  3 para 3.  She had natural menopause in her 50s.  She was never on hormone replacement therapy.  She has never had a bone density.  She denies alcohol or tobacco abuse.  She does not exercise regularly.    She has intermittent hypercalcemia since 2019.  Her highest serum calcium was 11.0 mg per DL.  24 urine calcium done in 2022 is 134 mg per 24 hours.    She had kidney stones 3 times in the past and had lithotripsy.  She last passed a stone 20 years ago.    She is adopted and does not know her family's history.    She has been having intermittent vaginal yeast infection.       The following portions of the patient's history were  "reviewed and updated as appropriate: allergies, current medications, past family history, past medical history, past social history, past surgical history and problem list.    Review of Systems   Eyes: Negative for visual disturbance.   Respiratory: Negative for shortness of breath.    Cardiovascular: Negative for chest pain and palpitations.   Gastrointestinal: Negative.    Endocrine: Negative for cold intolerance and heat intolerance.   Genitourinary: Negative.    Musculoskeletal: Negative for myalgias.   Neurological: Negative for numbness.     Vitals:    12/21/22 1511   BP: 110/60   Pulse: 88   Temp: 98 °F (36.7 °C)   TempSrc: Temporal   SpO2: 98%   Weight: 62.5 kg (137 lb 12.8 oz)   Height: 160 cm (62.99\")      Objective   Physical Exam  Constitutional:       General: She is not in acute distress.     Appearance: Normal appearance. She is not ill-appearing, toxic-appearing or diaphoretic.   Eyes:      General: No scleral icterus.        Right eye: No discharge.         Left eye: No discharge.      Extraocular Movements: Extraocular movements intact.      Conjunctiva/sclera: Conjunctivae normal.   Neck:      Vascular: No carotid bruit.   Cardiovascular:      Rate and Rhythm: Normal rate and regular rhythm.      Heart sounds: Normal heart sounds. No murmur heard.    No friction rub. No gallop.   Pulmonary:      Effort: No respiratory distress.      Breath sounds: Normal breath sounds. No rales.   Chest:      Chest wall: No tenderness.   Abdominal:      General: Bowel sounds are normal.      Palpations: Abdomen is soft.      Tenderness: There is no right CVA tenderness or left CVA tenderness.   Musculoskeletal:      Cervical back: No rigidity or tenderness.   Lymphadenopathy:      Cervical: No cervical adenopathy.   Skin:     General: Skin is warm.   Neurological:      Mental Status: She is alert and oriented to person, place, and time.   Psychiatric:         Mood and Affect: Mood normal.         Behavior: " Behavior normal.       Office Visit on 10/20/2022   Component Date Value Ref Range Status   • Hemoglobin A1C 10/20/2022 9.70 (H)  4.80 - 5.60 % Final    Comment: Hemoglobin A1C Ranges:  Increased Risk for Diabetes  5.7% to 6.4%  Diabetes                     >= 6.5%  Diabetic Goal                < 7.0%     • Glucose 10/20/2022 294 (H)  65 - 99 mg/dL Final   • BUN 10/20/2022 21  8 - 23 mg/dL Final   • Creatinine 10/20/2022 1.15 (H)  0.57 - 1.00 mg/dL Final   • EGFR Result 10/20/2022 53.0 (L)  >60.0 mL/min/1.73 Final    Comment: National Kidney Foundation and American Society of  Nephrology (ASN) Task Force recommended calculation based  on the Chronic Kidney Disease Epidemiology Collaboration  (CKD-EPI) equation refit without adjustment for race.  GFR Normal >60  Chronic Kidney Disease <60  Kidney Failure <15     • BUN/Creatinine Ratio 10/20/2022 18.3  7.0 - 25.0 Final   • Sodium 10/20/2022 136  136 - 145 mmol/L Final   • Potassium 10/20/2022 4.3  3.5 - 5.2 mmol/L Final   • Chloride 10/20/2022 94 (L)  98 - 107 mmol/L Final   • Total CO2 10/20/2022 29.6 (H)  22.0 - 29.0 mmol/L Final   • Calcium 10/20/2022 10.4  8.6 - 10.5 mg/dL Final   • Total Protein 10/20/2022 6.6  6.0 - 8.5 g/dL Final   • Albumin 10/20/2022 4.50  3.50 - 5.20 g/dL Final   • Globulin 10/20/2022 2.1  gm/dL Final   • A/G Ratio 10/20/2022 2.1  g/dL Final   • Total Bilirubin 10/20/2022 <0.2  0.0 - 1.2 mg/dL Final   • Alkaline Phosphatase 10/20/2022 127 (H)  39 - 117 U/L Final   • AST (SGOT) 10/20/2022 17  1 - 32 U/L Final   • ALT (SGPT) 10/20/2022 19  1 - 33 U/L Final   • Total Cholesterol 10/20/2022 281 (H)  0 - 200 mg/dL Final    Comment: Cholesterol Reference Ranges  (U.S. Department of Health and Human Services ATP III  Classifications)  Desirable          <200 mg/dL  Borderline High    200-239 mg/dL  High Risk          >240 mg/dL  Triglyceride Reference Ranges  (U.S. Department of Health and Human Services ATP III  Classifications)  Normal            <150 mg/dL  Borderline High  150-199 mg/dL  High             200-499 mg/dL  Very High        >500 mg/dL  HDL Reference Ranges  (U.S. Department of Health and Human Services ATP III  Classifications)  Low     <40 mg/dl (major risk factor for CHD)  High    >60 mg/dl ('negative' risk factor for CHD)  LDL Reference Ranges  (U.S. Department of Health and Human Services ATP III  Classifications)  Optimal          <100 mg/dL  Near Optimal     100-129 mg/dL  Borderline High  130-159 mg/dL  High             160-189 mg/dL  Very High        >189 mg/dL     • Triglycerides 10/20/2022 328 (H)  0 - 150 mg/dL Final   • HDL Cholesterol 10/20/2022 51  40 - 60 mg/dL Final   • VLDL Cholesterol Manoj 10/20/2022 62 (H)  5 - 40 mg/dL Final   • LDL Chol Calc ([facility]) 10/20/2022 168 (H)  0 - 100 mg/dL Final   • 25 Hydroxy, Vitamin D 10/20/2022 88.8  30.0 - 100.0 ng/ml Final    Comment: Results may be falsely increased if patient taking Biotin.  Reference Range for Total Vitamin D 25(OH)  Deficiency <20.0 ng/mL  Insufficiency 21-29 ng/mL  Sufficiency  ng/mL  Toxicity >100 ng/ml     • Interpretation 10/20/2022 Note   Final    Supplemental report is available.     Assessment & Plan   Diagnoses and all orders for this visit:    1. Type 2 diabetes mellitus with nephropathy (HCC) (Primary)  -     Comprehensive Metabolic Panel  -     Hemoglobin A1c    2. Primary hypertension  -     Comprehensive Metabolic Panel    3. Hyperlipidemia, unspecified hyperlipidemia type  -     Comprehensive Metabolic Panel  -     Lipid Panel  -     TSH    4. Vitamin D deficiency  -     Vitamin D,25-Hydroxy    5. Hypercalcemia  -     Comprehensive Metabolic Panel  -     Vitamin D,25-Hydroxy  -     PTH, Intact  -     Calcium, Ionized    6. History of nephrolithiasis  -     Comprehensive Metabolic Panel  -     Vitamin D,25-Hydroxy  -     PTH, Intact    7. Type 2 diabetes mellitus with stage 3a chronic kidney disease, without long-term current use of insulin (HCC)    8.  Postmenopausal  -     DEXA Bone Density Axial    Other orders  -     fluconazole (Diflucan) 150 MG tablet; Take 1 tablet by mouth 1 (One) Time for 1 dose.  Dispense: 1 tablet; Refill: 1      Continue Jardiance 25 daily, Actos 30 mg daily Ozempic 0.5 mg weekly.  May use Diflucan 150 mg single dose.  If patient keeps getting vaginal yeast infection, may need to discontinue Jardiance.  Continue Repatha 140 mg every 2 weeks.  Continue olmesartan 20 mg/day.  Continue vitamin D3 2000 units/day.  Schedule bone density.  Will work-up hypercalcemia further.    Copy of my note sent to Dr. Grayson    RTC 4 mos with RAFI Grayson NP.

## 2022-12-21 ENCOUNTER — OFFICE VISIT (OUTPATIENT)
Dept: ENDOCRINOLOGY | Age: 67
End: 2022-12-21

## 2022-12-21 VITALS
SYSTOLIC BLOOD PRESSURE: 110 MMHG | DIASTOLIC BLOOD PRESSURE: 60 MMHG | WEIGHT: 137.8 LBS | HEIGHT: 63 IN | HEART RATE: 88 BPM | OXYGEN SATURATION: 98 % | BODY MASS INDEX: 24.41 KG/M2 | TEMPERATURE: 98 F

## 2022-12-21 DIAGNOSIS — I10 PRIMARY HYPERTENSION: ICD-10-CM

## 2022-12-21 DIAGNOSIS — N18.31 TYPE 2 DIABETES MELLITUS WITH STAGE 3A CHRONIC KIDNEY DISEASE, WITHOUT LONG-TERM CURRENT USE OF INSULIN: ICD-10-CM

## 2022-12-21 DIAGNOSIS — E55.9 VITAMIN D DEFICIENCY: ICD-10-CM

## 2022-12-21 DIAGNOSIS — E78.5 HYPERLIPIDEMIA, UNSPECIFIED HYPERLIPIDEMIA TYPE: ICD-10-CM

## 2022-12-21 DIAGNOSIS — E83.52 HYPERCALCEMIA: ICD-10-CM

## 2022-12-21 DIAGNOSIS — Z78.0 POSTMENOPAUSAL: ICD-10-CM

## 2022-12-21 DIAGNOSIS — E11.22 TYPE 2 DIABETES MELLITUS WITH STAGE 3A CHRONIC KIDNEY DISEASE, WITHOUT LONG-TERM CURRENT USE OF INSULIN: ICD-10-CM

## 2022-12-21 DIAGNOSIS — E11.21 TYPE 2 DIABETES MELLITUS WITH NEPHROPATHY: Primary | ICD-10-CM

## 2022-12-21 DIAGNOSIS — Z87.442 HISTORY OF NEPHROLITHIASIS: ICD-10-CM

## 2022-12-21 PROCEDURE — 99214 OFFICE O/P EST MOD 30 MIN: CPT | Performed by: INTERNAL MEDICINE

## 2022-12-21 RX ORDER — EVOLOCUMAB 140 MG/ML
140 INJECTION, SOLUTION SUBCUTANEOUS
Qty: 1 ML | Refills: 2 | Status: SHIPPED | OUTPATIENT
Start: 2022-12-21 | End: 2022-12-29 | Stop reason: SDUPTHER

## 2022-12-21 RX ORDER — FLUCONAZOLE 150 MG/1
150 TABLET ORAL ONCE
Qty: 1 TABLET | Refills: 1 | Status: SHIPPED | OUTPATIENT
Start: 2022-12-21 | End: 2023-01-02 | Stop reason: SDUPTHER

## 2022-12-29 DIAGNOSIS — N18.31 TYPE 2 DIABETES MELLITUS WITH STAGE 3A CHRONIC KIDNEY DISEASE, WITHOUT LONG-TERM CURRENT USE OF INSULIN: ICD-10-CM

## 2022-12-29 DIAGNOSIS — E78.5 HYPERLIPIDEMIA, UNSPECIFIED HYPERLIPIDEMIA TYPE: ICD-10-CM

## 2022-12-29 DIAGNOSIS — E11.22 TYPE 2 DIABETES MELLITUS WITH STAGE 3A CHRONIC KIDNEY DISEASE, WITHOUT LONG-TERM CURRENT USE OF INSULIN: ICD-10-CM

## 2022-12-29 LAB
25(OH)D3+25(OH)D2 SERPL-MCNC: 140 NG/ML (ref 30–100)
ALBUMIN SERPL-MCNC: 4.3 G/DL (ref 3.5–5.2)
ALBUMIN/GLOB SERPL: 1.9 G/DL
ALP SERPL-CCNC: 96 U/L (ref 39–117)
ALT SERPL-CCNC: 12 U/L (ref 1–33)
AST SERPL-CCNC: <5 U/L (ref 1–32)
BILIRUB SERPL-MCNC: 0.3 MG/DL (ref 0–1.2)
BUN SERPL-MCNC: 23 MG/DL (ref 8–23)
BUN/CREAT SERPL: 20.4 (ref 7–25)
CA-I SERPL ISE-MCNC: 5.5 MG/DL (ref 4.5–5.6)
CALCIUM SERPL-MCNC: 9.8 MG/DL (ref 8.6–10.5)
CHLORIDE SERPL-SCNC: 101 MMOL/L (ref 98–107)
CHOLEST SERPL-MCNC: 123 MG/DL (ref 0–200)
CO2 SERPL-SCNC: 27.7 MMOL/L (ref 22–29)
CREAT SERPL-MCNC: 1.13 MG/DL (ref 0.57–1)
EGFRCR SERPLBLD CKD-EPI 2021: 54.1 ML/MIN/1.73
GLOBULIN SER CALC-MCNC: 2.3 GM/DL
GLUCOSE SERPL-MCNC: 164 MG/DL (ref 65–99)
HBA1C MFR BLD: 9.8 % (ref 4.8–5.6)
HDLC SERPL-MCNC: 55 MG/DL (ref 40–60)
IMP & REVIEW OF LAB RESULTS: NORMAL
LDLC SERPL CALC-MCNC: 47 MG/DL (ref 0–100)
POTASSIUM SERPL-SCNC: 4.4 MMOL/L (ref 3.5–5.2)
PROT SERPL-MCNC: 6.6 G/DL (ref 6–8.5)
PTH-INTACT SERPL-MCNC: 36 PG/ML (ref 15–65)
SODIUM SERPL-SCNC: 138 MMOL/L (ref 136–145)
TRIGL SERPL-MCNC: 116 MG/DL (ref 0–150)
TSH SERPL DL<=0.005 MIU/L-ACNC: 1.91 UIU/ML (ref 0.27–4.2)
VLDLC SERPL CALC-MCNC: 21 MG/DL (ref 5–40)

## 2022-12-29 NOTE — TELEPHONE ENCOUNTER
Pt called in wanting a refill of the repatha and ozempic. Pt is also wondering about switching to Januvia instead of Jardiance due to yeast infections. Please call back at [phone]. Pt is out of medication

## 2023-01-01 RX ORDER — SEMAGLUTIDE 1.34 MG/ML
1 INJECTION, SOLUTION SUBCUTANEOUS WEEKLY
Qty: 9 ML | Refills: 1 | Status: SHIPPED | OUTPATIENT
Start: 2023-01-01

## 2023-01-03 RX ORDER — SEMAGLUTIDE 1.34 MG/ML
INJECTION, SOLUTION SUBCUTANEOUS
Qty: 4.5 ML | Refills: 1 | OUTPATIENT
Start: 2023-01-03

## 2023-01-03 RX ORDER — EVOLOCUMAB 140 MG/ML
140 INJECTION, SOLUTION SUBCUTANEOUS
Qty: 6 ML | Refills: 1 | Status: SHIPPED | OUTPATIENT
Start: 2023-01-03 | End: 2023-04-03

## 2023-01-03 RX ORDER — FLUCONAZOLE 150 MG/1
150 TABLET ORAL ONCE
Qty: 1 TABLET | Refills: 0 | Status: SHIPPED | OUTPATIENT
Start: 2023-01-03 | End: 2023-01-03

## 2023-01-03 NOTE — TELEPHONE ENCOUNTER
Discontinue Jardiance because of recurrent vaginal yeast infection.  Increase Ozempic to 1 mg weekly as previously instructed.  Prescription for Diflucan was sent to pharmacy.

## 2023-01-09 DIAGNOSIS — E78.5 HYPERLIPIDEMIA, UNSPECIFIED HYPERLIPIDEMIA TYPE: ICD-10-CM

## 2023-01-09 RX ORDER — EVOLOCUMAB 140 MG/ML
140 INJECTION, SOLUTION SUBCUTANEOUS
Qty: 6 ML | Refills: 1 | OUTPATIENT
Start: 2023-01-09 | End: 2023-04-09

## 2023-01-11 DIAGNOSIS — Z78.0 POSTMENOPAUSAL: Primary | ICD-10-CM

## 2023-01-29 DIAGNOSIS — E11.22 TYPE 2 DIABETES MELLITUS WITH STAGE 3A CHRONIC KIDNEY DISEASE, WITHOUT LONG-TERM CURRENT USE OF INSULIN: ICD-10-CM

## 2023-01-29 DIAGNOSIS — N18.31 TYPE 2 DIABETES MELLITUS WITH STAGE 3A CHRONIC KIDNEY DISEASE, WITHOUT LONG-TERM CURRENT USE OF INSULIN: ICD-10-CM

## 2023-01-31 RX ORDER — PIOGLITAZONEHYDROCHLORIDE 30 MG/1
TABLET ORAL
Qty: 90 TABLET | Refills: 1 | Status: SHIPPED | OUTPATIENT
Start: 2023-01-31

## 2023-02-07 ENCOUNTER — APPOINTMENT (OUTPATIENT)
Dept: BONE DENSITY | Facility: HOSPITAL | Age: 68
End: 2023-02-07
Payer: COMMERCIAL

## 2023-02-09 ENCOUNTER — TELEPHONE (OUTPATIENT)
Dept: ENDOCRINOLOGY | Age: 68
End: 2023-02-09

## 2023-02-09 RX ORDER — EMPAGLIFLOZIN 25 MG/1
25 TABLET, FILM COATED ORAL DAILY
Qty: 30 TABLET | Refills: 5 | Status: SHIPPED | OUTPATIENT
Start: 2023-02-09

## 2023-02-09 NOTE — TELEPHONE ENCOUNTER
PT CALLED FOR A REFILL OF THE BELOW MED. PT ONLY HAS ONE PILL LEFT.    Jardiance 25 MG tablet tablet [[id number]]     GOING TO:    [facility]/pharmacy #[id number] - [place], [place] - [address] [place] [address] [phone] [facility] [phone]

## 2023-02-16 ENCOUNTER — PRIOR AUTHORIZATION (OUTPATIENT)
Dept: ENDOCRINOLOGY | Age: 68
End: 2023-02-16
Payer: COMMERCIAL

## 2023-03-15 RX ORDER — SPIRONOLACTONE 25 MG/1
25 TABLET ORAL DAILY
Qty: 90 TABLET | Refills: 3 | Status: SHIPPED | OUTPATIENT
Start: 2023-03-15

## 2023-04-26 DIAGNOSIS — Z12.11 COLON CANCER SCREENING: Primary | ICD-10-CM

## 2023-05-11 ENCOUNTER — OFFICE VISIT (OUTPATIENT)
Dept: ENDOCRINOLOGY | Age: 68
End: 2023-05-11
Payer: COMMERCIAL

## 2023-05-11 VITALS
HEART RATE: 90 BPM | HEIGHT: 63 IN | TEMPERATURE: 97.3 F | DIASTOLIC BLOOD PRESSURE: 70 MMHG | SYSTOLIC BLOOD PRESSURE: 114 MMHG | BODY MASS INDEX: 21.93 KG/M2 | OXYGEN SATURATION: 100 % | WEIGHT: 123.8 LBS

## 2023-05-11 DIAGNOSIS — E55.9 VITAMIN D DEFICIENCY: ICD-10-CM

## 2023-05-11 DIAGNOSIS — E11.22 TYPE 2 DIABETES MELLITUS WITH STAGE 3A CHRONIC KIDNEY DISEASE, WITHOUT LONG-TERM CURRENT USE OF INSULIN: Primary | ICD-10-CM

## 2023-05-11 DIAGNOSIS — E21.3 HYPERPARATHYROIDISM: ICD-10-CM

## 2023-05-11 DIAGNOSIS — N18.31 HYPERTENSIVE KIDNEY DISEASE WITH STAGE 3A CHRONIC KIDNEY DISEASE: ICD-10-CM

## 2023-05-11 DIAGNOSIS — E78.5 HYPERLIPIDEMIA, UNSPECIFIED HYPERLIPIDEMIA TYPE: ICD-10-CM

## 2023-05-11 DIAGNOSIS — N18.31 TYPE 2 DIABETES MELLITUS WITH STAGE 3A CHRONIC KIDNEY DISEASE, WITHOUT LONG-TERM CURRENT USE OF INSULIN: Primary | ICD-10-CM

## 2023-05-11 DIAGNOSIS — I12.9 HYPERTENSIVE KIDNEY DISEASE WITH STAGE 3A CHRONIC KIDNEY DISEASE: ICD-10-CM

## 2023-05-11 NOTE — PROGRESS NOTES
Chief Complaint  Chief Complaint   Patient presents with   • Diabetes     Type 2: Pt doesn't have meter, doesn't check bs regularly, is up to date on eye exam, no hx of retinopathy or neuropathy.        Subjective          History of Present Illness    Paz Perera 68 y.o. presents for a follow-up evaluation for type 2 DM     She has been diabetic since 2017     Pt is on the following medications for their DM: Jardiance 25 mg daily, Actos 30 mg daily and Ozempic 1 mg weekly       Dr. Martinez stopped Jardiance due to recurrent vaginal yeast infections, but pt never stopped taking it because she wasn't given anything else to take in its place.      Januvia stopped once GLP1 started  Metformin caused diarrhea and upset stomach        Pt complains of constipation and nausea - pt states that it isn't that bad as long as she eats in the morning    Denies diarrhea, chest pain, shortness of breath, vision changes or numbness and tingling in feet/hands.    Weight loss of 14 lbs since last visit.    Pt does not have a history of DM retinopathy.  Last eye exam was 06/20/22     Pt does have a history of nephropathy, CKD Stage 3a.  Patient is currently taking ARB     Pt does not have neuropathy.      Pt does not have a history of CAD or CVA.    Last A1C in 12/22 was 9.80    Last microalbumin in 02/22 was negative          Blood Sugars    Blood glucoses are not checked          Hyperlipidemia     Pt is currently taking nothing  Repatha 140 mg every 14 days stopped due to joint pain  Lipitor caused myalgia and she has refused statin therapy in the past.  Zetia caused mucle pain    Last lipid panel in 12/22 showed Total 123, HDL 55, LDL 47 and Triglycerides 116            Hypertension with CKD stage 3a    Pt denies any chest pain, palpitations, shortness of breath, or headache    Current regimen includes olmesartan 20 mg daily  She is on spironolactone 25 mg daily for acne prescribed by her PCP.               Vitamin D  "Deficiency     Current treatment includes 2,000 units daily - she never stopped as advised by Dr. Martinez in 12/22 due to elevated blood levels     Last Vit D level was 140 in 12/22                  Hyperparathyroidism     Calcium has been elevated on and off throughout the years, with last calcium of 10.5 in 03/22.  Pt states that she doesn't take a supplement and itsn't on thiazide.  Vitamin D levels are normal.  PTH was normal at 25, as was ionized calcium at 5.5 in 03/22.      24 hour urine showed hyperparathyroidism     Last calcium in 12/22 was 9.8     Never got parathyroid scan     Never got DEXA scan             I have reviewed the patient's allergies, medicines, past medical hx, family hx and social hx.    Objective   Vital Signs:   /70   Pulse 90   Temp 97.3 °F (36.3 °C) (Temporal)   Ht 160 cm (62.99\")   Wt 56.2 kg (123 lb 12.8 oz)   SpO2 100%   BMI 21.94 kg/m²       Physical Exam   Physical Exam  Constitutional:       General: She is not in acute distress.     Appearance: Normal appearance. She is not diaphoretic.   HENT:      Head: Normocephalic and atraumatic.   Eyes:      General:         Right eye: No discharge.         Left eye: No discharge.   Skin:     General: Skin is warm and dry.   Neurological:      Mental Status: She is alert.   Psychiatric:         Mood and Affect: Mood normal.         Behavior: Behavior normal.                    Results Review:   Hemoglobin A1C   Date Value Ref Range Status   12/28/2022 9.80 (H) 4.80 - 5.60 % Final     Comment:     Hemoglobin A1C Ranges:  Increased Risk for Diabetes  5.7% to 6.4%  Diabetes                     >= 6.5%  Diabetic Goal                < 7.0%     08/25/2021 7.70 (H) 4.80 - 5.60 % Final     Total Cholesterol   Date Value Ref Range Status   08/25/2021 257 (H) 0 - 200 mg/dL Final     Triglycerides   Date Value Ref Range Status   12/28/2022 116 0 - 150 mg/dL Final   08/25/2021 156 (H) 0 - 150 mg/dL Final     HDL Cholesterol   Date Value Ref " Range Status   12/28/2022 55 40 - 60 mg/dL Final   08/25/2021 60 40 - 60 mg/dL Final     LDL Chol Calc (NIH)   Date Value Ref Range Status   12/28/2022 47 0 - 100 mg/dL Final     VLDL Cholesterol Manoj   Date Value Ref Range Status   12/28/2022 21 5 - 40 mg/dL Final     LDL/HDL Ratio   Date Value Ref Range Status   08/25/2021 2.76  Final         Assessment and Plan {CC Problem List  Visit Diagnosis  ROS  Review (Popup)  Health Maintenance  Quality  BestPractice  Medications  SmartSets  SnapShot Encounters  Media :23  Diagnoses and all orders for this visit:    1. Type 2 diabetes mellitus with stage 3a chronic kidney disease, without long-term current use of insulin (Primary)  -     Hemoglobin A1c  -     Comprehensive Metabolic Panel  -     empagliflozin (Jardiance) 10 MG tablet tablet; Take 1 tablet by mouth Daily. By mouth take one tab daily in AM.  Dispense: 90 tablet; Refill: 1    Pt never stopped Jardiance as advised.  Decrease to 10 mg daily instead of 25 mg daily to see if it helps with vaginal yeast infections.  Advised pt that if she continue with vaginal yeast infections after reduction then we will discontinue it.  Continue with Ozempic 1 mg weekly and Actos 30 mg daily  Check blood sugars daily  Check labs        2. Hyperlipidemia, unspecified hyperlipidemia type  -     Comprehensive Metabolic Panel  -     Lipid Panel    Check labs today  Statin and Repatha intolerant      3. Hypertensive kidney disease with stage 3a chronic kidney disease  -     Comprehensive Metabolic Panel    Stable  Continue with current medication regimen  Defer management to PCP        4. Hyperparathyroidism  -     Comprehensive Metabolic Panel    Check labs  Never got DEXA nor parathyroid scan      5. Vitamin D deficiency  -     Vitamin D,25-Hydroxy    Pt never stopped supplement as advised  Check labs            Labs today  RTC in 3-4 months with me and 6-7 months with Dr. Martinez      Follow Up     Patient was given  instructions and counseling regarding her condition or for health maintenance advice. Please see specific information pulled into the AVS if appropriate.              Shruthi Grayson, LUIS  05/11/23

## 2023-05-12 LAB
25(OH)D3+25(OH)D2 SERPL-MCNC: 111 NG/ML (ref 30–100)
ALBUMIN SERPL-MCNC: 4.8 G/DL (ref 3.5–5.2)
ALBUMIN/GLOB SERPL: 2.4 G/DL
ALP SERPL-CCNC: 88 U/L (ref 39–117)
ALT SERPL-CCNC: 17 U/L (ref 1–33)
AST SERPL-CCNC: 16 U/L (ref 1–32)
BILIRUB SERPL-MCNC: 0.3 MG/DL (ref 0–1.2)
BUN SERPL-MCNC: 18 MG/DL (ref 8–23)
BUN/CREAT SERPL: 18.2 (ref 7–25)
CALCIUM SERPL-MCNC: 11.1 MG/DL (ref 8.6–10.5)
CHLORIDE SERPL-SCNC: 99 MMOL/L (ref 98–107)
CHOLEST SERPL-MCNC: 239 MG/DL (ref 0–200)
CO2 SERPL-SCNC: 29.2 MMOL/L (ref 22–29)
CREAT SERPL-MCNC: 0.99 MG/DL (ref 0.57–1)
EGFRCR SERPLBLD CKD-EPI 2021: 63 ML/MIN/1.73
GLOBULIN SER CALC-MCNC: 2 GM/DL
GLUCOSE SERPL-MCNC: 92 MG/DL (ref 65–99)
HBA1C MFR BLD: 7.2 % (ref 4.8–5.6)
HDLC SERPL-MCNC: 58 MG/DL (ref 40–60)
IMP & REVIEW OF LAB RESULTS: NORMAL
LDLC SERPL CALC-MCNC: 154 MG/DL (ref 0–100)
POTASSIUM SERPL-SCNC: 4.4 MMOL/L (ref 3.5–5.2)
PROT SERPL-MCNC: 6.8 G/DL (ref 6–8.5)
REPORT: NORMAL
SODIUM SERPL-SCNC: 140 MMOL/L (ref 136–145)
TRIGL SERPL-MCNC: 149 MG/DL (ref 0–150)
VLDLC SERPL CALC-MCNC: 27 MG/DL (ref 5–40)

## 2023-07-26 ENCOUNTER — TELEPHONE (OUTPATIENT)
Dept: FAMILY MEDICINE CLINIC | Facility: CLINIC | Age: 68
End: 2023-07-26
Payer: COMMERCIAL

## 2023-07-26 NOTE — TELEPHONE ENCOUNTER
Caller: Paz Perera    Relationship: Self    Best call back number: [phone]    What form or medical record are you requesting: MEDICAL RECORDS    Who is requesting this form or medical record from you: NEW PCP OFFICE (rajesh sheppard)     How would you like to receive the form or medical records (pick-up, mail, fax):MAIL   [place] [id number]     Timeframe paperwork needed:ASAP

## 2023-08-02 RX ORDER — EMPAGLIFLOZIN 25 MG/1
TABLET, FILM COATED ORAL
Qty: 30 TABLET | Refills: 5 | OUTPATIENT
Start: 2023-08-02

## 2023-08-07 RX ORDER — EMPAGLIFLOZIN 25 MG/1
TABLET, FILM COATED ORAL
Qty: 30 TABLET | Refills: 5 | OUTPATIENT
Start: 2023-08-07

## 2023-09-13 ENCOUNTER — OFFICE VISIT (OUTPATIENT)
Dept: ENDOCRINOLOGY | Age: 68
End: 2023-09-13
Payer: COMMERCIAL

## 2023-09-13 VITALS
DIASTOLIC BLOOD PRESSURE: 70 MMHG | OXYGEN SATURATION: 100 % | HEIGHT: 63 IN | SYSTOLIC BLOOD PRESSURE: 118 MMHG | BODY MASS INDEX: 21.37 KG/M2 | HEART RATE: 77 BPM | WEIGHT: 120.6 LBS

## 2023-09-13 DIAGNOSIS — N18.31 HYPERTENSIVE KIDNEY DISEASE WITH STAGE 3A CHRONIC KIDNEY DISEASE: ICD-10-CM

## 2023-09-13 DIAGNOSIS — E55.9 VITAMIN D DEFICIENCY: ICD-10-CM

## 2023-09-13 DIAGNOSIS — N18.31 TYPE 2 DIABETES MELLITUS WITH STAGE 3A CHRONIC KIDNEY DISEASE, WITHOUT LONG-TERM CURRENT USE OF INSULIN: Primary | ICD-10-CM

## 2023-09-13 DIAGNOSIS — I12.9 HYPERTENSIVE KIDNEY DISEASE WITH STAGE 3A CHRONIC KIDNEY DISEASE: ICD-10-CM

## 2023-09-13 DIAGNOSIS — E78.5 HYPERLIPIDEMIA, UNSPECIFIED HYPERLIPIDEMIA TYPE: ICD-10-CM

## 2023-09-13 DIAGNOSIS — E11.22 TYPE 2 DIABETES MELLITUS WITH STAGE 3A CHRONIC KIDNEY DISEASE, WITHOUT LONG-TERM CURRENT USE OF INSULIN: Primary | ICD-10-CM

## 2023-09-13 DIAGNOSIS — E21.3 HYPERPARATHYROIDISM: ICD-10-CM

## 2023-09-13 NOTE — PROGRESS NOTES
Chief Complaint  Chief Complaint   Patient presents with    Diabetes     Type 2: Pt doesn't use meter, is not up to date on eye exam, no hx of retinopathy or neuropathy.        Subjective          History of Present Illness    Paz Perera 68 y.o. presents for a follow-up evaluation for type 2 DM     She has been diabetic since 2017     Pt is on the following medications for their DM: Jardiance 10 mg daily, Actos 30 mg daily and Ozempic 1 mg weekly        Jardiance was decreased from 25 mg to 10 mg to see if it helped with vaginal yeast infections   Januvia stopped once GLP1 started  Metformin caused diarrhea and upset stomach       Denies diarrhea, constipation, chest pain, shortness of breath, vision changes or numbness and tingling in feet/hands.    Weight loss of 3 lbs since last visit.    Pt does not have a history of DM retinopathy.  Last eye exam was 06/20/22     Pt does have a history of nephropathy, CKD Stage 3a.  Patient is currently taking ARB     Pt does not have neuropathy.      Pt does not have a history of CAD or CVA.    Last A1C in 05/23 was 7.2    Last microalbumin in 02/22 was negative          Blood Sugars    Blood glucoses are not checked              Hyperlipidemia     Pt is currently taking nothing  Repatha 140 mg every 14 days stopped due to joint pain  Lipitor caused myalgia and she has refused statin therapy in the past.  Zetia caused mucle pain    Last lipid panel in 05/23 showed Total 239, HDL 58,  and Triglycerides 149              Hypertension with CKD stage 3a     Pt denies any chest pain, palpitations, shortness of breath, or headache     Current regimen includes olmesartan 20 mg daily  She is on spironolactone 25 mg daily for acne prescribed by her PCP.                     Vitamin D Deficiency     Current treatment includes 2,000 units daily - she never stopped as advised by Dr. Martinez in 12/22 due to elevated blood levels     Last Vit D level was 111.0 in 05/23                 "  Hyperparathyroidism     Calcium has been elevated on and off throughout the years, with last calcium of 10.5 in 03/22.  Pt states that she doesn't take a supplement and itsn't on thiazide.  Vitamin D levels are normal.  PTH was normal at 25, as was ionized calcium at 5.5 in 03/22.      24 hour urine showed hyperparathyroidism     Last calcium in 05/23 was 11.1     Never got parathyroid scan that was ordered     Never got DEXA scan             I have reviewed the patient's allergies, medicines, past medical hx, family hx and social hx.    Objective   Vital Signs:   /70   Pulse 77   Ht 160 cm (62.99\")   Wt 54.7 kg (120 lb 9.6 oz)   SpO2 100%   BMI 21.37 kg/m²       Physical Exam   Physical Exam  Constitutional:       General: She is not in acute distress.     Appearance: Normal appearance. She is not diaphoretic.   HENT:      Head: Normocephalic and atraumatic.   Eyes:      General:         Right eye: No discharge.         Left eye: No discharge.   Skin:     General: Skin is warm and dry.   Neurological:      Mental Status: She is alert.   Psychiatric:         Mood and Affect: Mood normal.         Behavior: Behavior normal.                  Results Review:   Hemoglobin A1C   Date Value Ref Range Status   05/11/2023 7.20 (H) 4.80 - 5.60 % Final     Comment:     Hemoglobin A1C Ranges:  Increased Risk for Diabetes  5.7% to 6.4%  Diabetes                     >= 6.5%  Diabetic Goal                < 7.0%     08/25/2021 7.70 (H) 4.80 - 5.60 % Final     Total Cholesterol   Date Value Ref Range Status   08/25/2021 257 (H) 0 - 200 mg/dL Final     Triglycerides   Date Value Ref Range Status   05/11/2023 149 0 - 150 mg/dL Final   08/25/2021 156 (H) 0 - 150 mg/dL Final     HDL Cholesterol   Date Value Ref Range Status   05/11/2023 58 40 - 60 mg/dL Final   08/25/2021 60 40 - 60 mg/dL Final     LDL Chol Calc (NIH)   Date Value Ref Range Status   05/11/2023 154 (H) 0 - 100 mg/dL Final     VLDL Cholesterol Manoj   Date " Value Ref Range Status   05/11/2023 27 5 - 40 mg/dL Final     LDL/HDL Ratio   Date Value Ref Range Status   08/25/2021 2.76  Final         Assessment and Plan {CC Problem List  Visit Diagnosis  ROS  Review (Popup)  Health Maintenance  Quality  BestPractice  Medications  SmartSets  SnapShot Encounters  Media :23  Diagnoses and all orders for this visit:    1. Type 2 diabetes mellitus with stage 3a chronic kidney disease, without long-term current use of insulin (Primary)  -     Hemoglobin A1c  -     Comprehensive Metabolic Panel  -     Microalbumin / Creatinine Urine Ratio - Urine, Clean Catch    Check labs today  Continue with Jardiance 10 mg daily, Actos 30 mg daily and Ozempic 1 mg weekly  Please check blood sugars at least a couple times a week      2. Hyperlipidemia, unspecified hyperlipidemia type  -     Comprehensive Metabolic Panel  -     Lipid Panel    Check labs today  Continue with dietary modification  Didn't tolerate statins or Repatha      3. Hypertensive kidney disease with stage 3a chronic kidney disease  -     Comprehensive Metabolic Panel    Stable  Continue with current medication regimen  Defer management to PCP      4. Vitamin D deficiency  -     Vitamin D,25-Hydroxy    Stopped supplement due to levels being high  Recheck levels today      5. Hyperparathyroidism  -     Comprehensive Metabolic Panel  -     Vitamin D,25-Hydroxy    Check labs today  Pt never got NP thyroid scan nor DEXA            Labs today  RTC on 12/11/23 with Dr. Martinez      Follow Up     Patient was given instructions and counseling regarding her condition or for health maintenance advice. Please see specific information pulled into the AVS if appropriate.              Shruthi Grayson, LUIS  09/13/23

## 2023-09-14 DIAGNOSIS — N18.31 TYPE 2 DIABETES MELLITUS WITH STAGE 3A CHRONIC KIDNEY DISEASE, WITHOUT LONG-TERM CURRENT USE OF INSULIN: ICD-10-CM

## 2023-09-14 DIAGNOSIS — E11.22 TYPE 2 DIABETES MELLITUS WITH STAGE 3A CHRONIC KIDNEY DISEASE, WITHOUT LONG-TERM CURRENT USE OF INSULIN: ICD-10-CM

## 2023-09-14 DIAGNOSIS — E21.3 HYPERPARATHYROIDISM: Primary | ICD-10-CM

## 2023-09-14 LAB
25(OH)D3+25(OH)D2 SERPL-MCNC: 74.2 NG/ML (ref 30–100)
ALBUMIN SERPL-MCNC: 4.7 G/DL (ref 3.5–5.2)
ALBUMIN/CREAT UR: 6 MG/G CREAT (ref 0–29)
ALBUMIN/GLOB SERPL: 2.1 G/DL
ALP SERPL-CCNC: 97 U/L (ref 39–117)
ALT SERPL-CCNC: 13 U/L (ref 1–33)
AST SERPL-CCNC: 13 U/L (ref 1–32)
BILIRUB SERPL-MCNC: 0.3 MG/DL (ref 0–1.2)
BUN SERPL-MCNC: 22 MG/DL (ref 8–23)
BUN/CREAT SERPL: 25 (ref 7–25)
CALCIUM SERPL-MCNC: 10.9 MG/DL (ref 8.6–10.5)
CHLORIDE SERPL-SCNC: 100 MMOL/L (ref 98–107)
CHOLEST SERPL-MCNC: 251 MG/DL (ref 0–200)
CO2 SERPL-SCNC: 28.3 MMOL/L (ref 22–29)
CREAT SERPL-MCNC: 0.88 MG/DL (ref 0.57–1)
CREAT UR-MCNC: 74.9 MG/DL
EGFRCR SERPLBLD CKD-EPI 2021: 72.6 ML/MIN/1.73
GLOBULIN SER CALC-MCNC: 2.2 GM/DL
GLUCOSE SERPL-MCNC: 105 MG/DL (ref 65–99)
HBA1C MFR BLD: 7.1 % (ref 4.8–5.6)
HDLC SERPL-MCNC: 57 MG/DL (ref 40–60)
IMP & REVIEW OF LAB RESULTS: NORMAL
LDLC SERPL CALC-MCNC: 149 MG/DL (ref 0–100)
MICROALBUMIN UR-MCNC: 4.8 UG/ML
POTASSIUM SERPL-SCNC: 4.5 MMOL/L (ref 3.5–5.2)
PROT SERPL-MCNC: 6.9 G/DL (ref 6–8.5)
REPORT: NORMAL
SODIUM SERPL-SCNC: 139 MMOL/L (ref 136–145)
TRIGL SERPL-MCNC: 247 MG/DL (ref 0–150)
VLDLC SERPL CALC-MCNC: 45 MG/DL (ref 5–40)

## 2023-09-14 RX ORDER — SEMAGLUTIDE 2.68 MG/ML
2 INJECTION, SOLUTION SUBCUTANEOUS WEEKLY
Qty: 9 ML | Refills: 1 | Status: SHIPPED | OUTPATIENT
Start: 2023-09-14

## 2023-09-25 DIAGNOSIS — E11.22 TYPE 2 DIABETES MELLITUS WITH STAGE 3A CHRONIC KIDNEY DISEASE, WITHOUT LONG-TERM CURRENT USE OF INSULIN: ICD-10-CM

## 2023-09-25 DIAGNOSIS — N18.31 TYPE 2 DIABETES MELLITUS WITH STAGE 3A CHRONIC KIDNEY DISEASE, WITHOUT LONG-TERM CURRENT USE OF INSULIN: ICD-10-CM

## 2023-09-25 RX ORDER — PIOGLITAZONEHYDROCHLORIDE 30 MG/1
30 TABLET ORAL DAILY
Qty: 90 TABLET | Refills: 1 | Status: SHIPPED | OUTPATIENT
Start: 2023-09-25

## 2023-09-25 NOTE — TELEPHONE ENCOUNTER
Rx Refill Note  Requested Prescriptions     Pending Prescriptions Disp Refills    pioglitazone (ACTOS) 30 MG tablet 90 tablet 1     Sig: Take 1 tablet by mouth Daily for 90 days.      Last office visit with prescribing clinician: 9/13/2023   Last telemedicine visit with prescribing clinician: Visit date not found   Next office visit with prescribing clinician: Visit date not found                         Would you like a call back once the refill request has been completed: [] Yes [] No    If the office needs to give you a call back, can they leave a voicemail: [] Yes [] No    Evangelina Campos  09/25/23, 14:01 EDT

## 2023-10-23 RX ORDER — SEMAGLUTIDE 1.34 MG/ML
1 INJECTION, SOLUTION SUBCUTANEOUS WEEKLY
OUTPATIENT
Start: 2023-10-23 | End: 2024-01-21

## 2023-10-24 DIAGNOSIS — N18.31 TYPE 2 DIABETES MELLITUS WITH STAGE 3A CHRONIC KIDNEY DISEASE, WITHOUT LONG-TERM CURRENT USE OF INSULIN: ICD-10-CM

## 2023-10-24 DIAGNOSIS — E11.22 TYPE 2 DIABETES MELLITUS WITH STAGE 3A CHRONIC KIDNEY DISEASE, WITHOUT LONG-TERM CURRENT USE OF INSULIN: ICD-10-CM

## 2023-10-24 NOTE — TELEPHONE ENCOUNTER
Rx Refill Note  Requested Prescriptions     Pending Prescriptions Disp Refills    Jardiance 10 MG tablet tablet [Pharmacy Med Name: JARDIANCE 10 MG TABLET] 90 tablet 1     Sig: TAKE 1 TABLET BY MOUTH IN THE MORNING      Last office visit with prescribing clinician: 9/13/2023   Last telemedicine visit with prescribing clinician: Visit date not found   Next office visit with prescribing clinician: Visit date not found                         Would you like a call back once the refill request has been completed: [] Yes [] No    If the office needs to give you a call back, can they leave a voicemail: [] Yes [] No    Evangelina Campos  10/24/23, 13:59 EDT

## 2023-10-25 RX ORDER — EMPAGLIFLOZIN 10 MG/1
10 TABLET, FILM COATED ORAL EVERY MORNING
Qty: 90 TABLET | Refills: 1 | Status: SHIPPED | OUTPATIENT
Start: 2023-10-25

## 2023-11-02 RX ORDER — SEMAGLUTIDE 1.34 MG/ML
1 INJECTION, SOLUTION SUBCUTANEOUS WEEKLY
Qty: 9 ML | Refills: 1 | Status: SHIPPED | OUTPATIENT
Start: 2023-11-02 | End: 2023-11-02 | Stop reason: DRUGHIGH

## 2023-11-02 RX ORDER — SEMAGLUTIDE 2.68 MG/ML
2 INJECTION, SOLUTION SUBCUTANEOUS WEEKLY
Qty: 9 ML | Refills: 1 | Status: SHIPPED | OUTPATIENT
Start: 2023-11-02

## 2023-11-03 ENCOUNTER — TELEPHONE (OUTPATIENT)
Dept: ENDOCRINOLOGY | Age: 68
End: 2023-11-03
Payer: COMMERCIAL

## 2023-11-03 NOTE — TELEPHONE ENCOUNTER
I informed patient of back order and that if that was still the case then we would have to decrease to 1 mg until 2 mg is available.  1 mg prescription was also sent; she will have to have that one filled

## 2023-11-29 NOTE — TELEPHONE ENCOUNTER
Rx Refill Note  Requested Prescriptions     Pending Prescriptions Disp Refills    Semaglutide, 2 MG/DOSE, (Ozempic, 2 MG/DOSE,) 8 MG/3ML solution pen-injector 9 mL 1     Sig: Inject 2 mg under the skin into the appropriate area as directed 1 (One) Time Per Week.      Last office visit with prescribing clinician: 9/13/2023   Last telemedicine visit with prescribing clinician: Visit date not found   Next office visit with prescribing clinician: Visit date not found                         Would you like a call back once the refill request has been completed: [] Yes [] No    If the office needs to give you a call back, can they leave a voicemail: [] Yes [] No    Evangelina Campos  11/29/23, 12:55 EST

## 2023-11-30 RX ORDER — SEMAGLUTIDE 2.68 MG/ML
2 INJECTION, SOLUTION SUBCUTANEOUS WEEKLY
Qty: 9 ML | Refills: 1 | Status: SHIPPED | OUTPATIENT
Start: 2023-11-30

## 2023-12-11 ENCOUNTER — OFFICE VISIT (OUTPATIENT)
Dept: ENDOCRINOLOGY | Age: 68
End: 2023-12-11
Payer: COMMERCIAL

## 2023-12-11 VITALS
SYSTOLIC BLOOD PRESSURE: 106 MMHG | BODY MASS INDEX: 19.84 KG/M2 | DIASTOLIC BLOOD PRESSURE: 72 MMHG | HEART RATE: 70 BPM | WEIGHT: 112 LBS | HEIGHT: 63 IN | TEMPERATURE: 96.8 F | OXYGEN SATURATION: 98 %

## 2023-12-11 DIAGNOSIS — E55.9 VITAMIN D DEFICIENCY: ICD-10-CM

## 2023-12-11 DIAGNOSIS — E78.5 HYPERLIPIDEMIA, UNSPECIFIED HYPERLIPIDEMIA TYPE: ICD-10-CM

## 2023-12-11 DIAGNOSIS — E11.9 TYPE 2 DIABETES MELLITUS WITHOUT COMPLICATION, WITHOUT LONG-TERM CURRENT USE OF INSULIN: Primary | ICD-10-CM

## 2023-12-11 DIAGNOSIS — I10 PRIMARY HYPERTENSION: ICD-10-CM

## 2023-12-11 DIAGNOSIS — Z78.9 STATIN INTOLERANCE: ICD-10-CM

## 2023-12-11 DIAGNOSIS — E83.52 HYPERCALCEMIA: ICD-10-CM

## 2023-12-11 DIAGNOSIS — Z87.442 HISTORY OF KIDNEY STONES: ICD-10-CM

## 2023-12-11 NOTE — PROGRESS NOTES
Royer Perera is a 68 y.o. female.     History of Present Illness       She has known diabetes mellitus since .  She is on Jardiance 10 mg once a day, Actos 30 mg once a day, and Ozempic 2 mg weekly.  She checks her blood sugar once a week and random glucose runs 106-115.  She denies hypoglycemia.  She has lost 8 pounds since .  She had diarrhea with metformin in the past.  She was on Januvia without side effects in the past.  Her last meal was at 7:30 AM (coffee with cream)     Urine microalbumin was normal in .  She had an eye exam in 2022 and she has no retinopathy.  She denies numbness or tingling in her hands or feet.     She has hyperlipidemia and is on diet alone.  She had arthralgia with Repatha before.  She had myalgia with Lipitor and Zetia in the past.       She has hypertension and is on olmesartan 20 mg/day.  She is on spironolactone 25 mg daily for acne prescribed by her PCP.  She has no history of heart attack or stroke.  She denies chest pain or shortness of breath.     She has vitamin D deficiency and is off vitamin D3 2000 units/day.     She is  3 para 3.  She had natural menopause in her 50s.  She was never on hormone replacement therapy.  She has never had a bone density.  She denies alcohol or tobacco abuse.  She does not exercise regularly.  She did not get her bone density scheduled because she has not met her deductible.     She has intermittent hypercalcemia since 2019.  Her highest serum calcium was 11.0 mg per DL.  24 urine calcium done in 2022 is 134 mg per 24 hours.     She had kidney stones 3 times in the past and had lithotripsy.  She last passed a stone 20 years ago.  She has 1 child with history of kidney stones.     She is adopted and does not know her family's history.     She has not had a vaginal yeast infection since Jardiance was reduced to 10 mg daily.    The following portions of the patient's history were reviewed and  "updated as appropriate: allergies, current medications, past family history, past medical history, past social history, past surgical history, and problem list.    Review of Systems   Eyes:  Negative for visual disturbance.   Respiratory:  Negative for shortness of breath and wheezing.    Cardiovascular:  Negative for chest pain and palpitations.   Gastrointestinal: Negative.    Genitourinary: Negative.    Musculoskeletal:  Negative for myalgias.   Neurological:  Negative for numbness.     Vitals:    12/11/23 1155   BP: 106/72   Pulse: 70   Temp: 96.8 °F (36 °C)   TempSrc: Temporal   SpO2: 98%   Weight: 50.8 kg (112 lb)   Height: 160 cm (62.99\")      Objective   Physical Exam  Constitutional:       General: She is not in acute distress.     Appearance: Normal appearance. She is not ill-appearing or toxic-appearing.   Eyes:      General: No scleral icterus.        Right eye: No discharge.         Left eye: No discharge.      Extraocular Movements: Extraocular movements intact.      Conjunctiva/sclera: Conjunctivae normal.   Neck:      Vascular: No carotid bruit.   Cardiovascular:      Rate and Rhythm: Normal rate and regular rhythm.      Heart sounds: Normal heart sounds. No murmur heard.     No friction rub. No gallop.   Pulmonary:      Effort: No respiratory distress.      Breath sounds: Normal breath sounds. No stridor. No rales.   Chest:      Chest wall: No tenderness.   Abdominal:      General: Bowel sounds are normal.      Palpations: Abdomen is soft.      Tenderness: There is no right CVA tenderness or left CVA tenderness.   Musculoskeletal:      Right lower leg: No edema.      Left lower leg: No edema.   Lymphadenopathy:      Cervical: No cervical adenopathy.   Skin:     General: Skin is warm and dry.   Neurological:      Mental Status: She is alert.      Comments: Intact light touch on lower ext.       Office Visit on 09/13/2023   Component Date Value Ref Range Status    Hemoglobin A1C 09/13/2023 7.10 (H)  " 4.80 - 5.60 % Final    Comment: Hemoglobin A1C Ranges:  Increased Risk for Diabetes  5.7% to 6.4%  Diabetes                     >= 6.5%  Diabetic Goal                < 7.0%      Glucose 09/13/2023 105 (H)  65 - 99 mg/dL Final    BUN 09/13/2023 22  8 - 23 mg/dL Final    Creatinine 09/13/2023 0.88  0.57 - 1.00 mg/dL Final    EGFR Result 09/13/2023 72.6  >60.0 mL/min/1.73 Final    Comment: GFR Normal >60  Chronic Kidney Disease <60  Kidney Failure <15      BUN/Creatinine Ratio 09/13/2023 25.0  7.0 - 25.0 Final    Sodium 09/13/2023 139  136 - 145 mmol/L Final    Potassium 09/13/2023 4.5  3.5 - 5.2 mmol/L Final    Chloride 09/13/2023 100  98 - 107 mmol/L Final    Total CO2 09/13/2023 28.3  22.0 - 29.0 mmol/L Final    Calcium 09/13/2023 10.9 (H)  8.6 - 10.5 mg/dL Final    Total Protein 09/13/2023 6.9  6.0 - 8.5 g/dL Final    Albumin 09/13/2023 4.7  3.5 - 5.2 g/dL Final    Globulin 09/13/2023 2.2  gm/dL Final    A/G Ratio 09/13/2023 2.1  g/dL Final    Total Bilirubin 09/13/2023 0.3  0.0 - 1.2 mg/dL Final    Alkaline Phosphatase 09/13/2023 97  39 - 117 U/L Final    AST (SGOT) 09/13/2023 13  1 - 32 U/L Final    ALT (SGPT) 09/13/2023 13  1 - 33 U/L Final    Creatinine, Urine 09/13/2023 74.9  Not Estab. mg/dL Final    Microalbumin, Urine 09/13/2023 4.8  Not Estab. ug/mL Final    Microalbumin/Creatinine Ratio 09/13/2023 6  0 - 29 mg/g creat Final    Comment:                        Normal:                0 -  29                         Moderately increased: 30 - 300                         Severely increased:       >300      Total Cholesterol 09/13/2023 251 (H)  0 - 200 mg/dL Final    Comment: Cholesterol Reference Ranges  (U.S. Department of Health and Human Services ATP III  Classifications)  Desirable          <200 mg/dL  Borderline High    200-239 mg/dL  High Risk          >240 mg/dL  Triglyceride Reference Ranges  (U.S. Department of Health and Human Services ATP III  Classifications)  Normal           <150  mg/dL  Borderline High  150-199 mg/dL  High             200-499 mg/dL  Very High        >500 mg/dL  HDL Reference Ranges  (U.S. Department of Health and Human Services ATP III  Classifications)  Low     <40 mg/dl (major risk factor for CHD)  High    >60 mg/dl ('negative' risk factor for CHD)  LDL Reference Ranges  (U.S. Department of Health and Human Services ATP III  Classifications)  Optimal          <100 mg/dL  Near Optimal     100-129 mg/dL  Borderline High  130-159 mg/dL  High             160-189 mg/dL  Very High        >189 mg/dL      Triglycerides 09/13/2023 247 (H)  0 - 150 mg/dL Final    HDL Cholesterol 09/13/2023 57  40 - 60 mg/dL Final    VLDL Cholesterol Manoj 09/13/2023 45 (H)  5 - 40 mg/dL Final    LDL Chol Calc (NIH) 09/13/2023 149 (H)  0 - 100 mg/dL Final    25 Hydroxy, Vitamin D 09/13/2023 74.2  30.0 - 100.0 ng/ml Final    Comment: Reference Range for Total Vitamin D 25(OH)  Deficiency <20.0 ng/mL  Insufficiency 21-29 ng/mL  Sufficiency  ng/mL  Toxicity >100 ng/ml      Interpretation 09/13/2023 Note   Final    Comment: -------------------------------  CHRONIC KIDNEY DISEASE:  EGFR, BLOOD PRESSURE, AND PROTEINURIA ASSESSMENT  Estimated GFR is 60 mL/min/1.73mE2 or above; this patient is  not presently in CKD stage 3 or higher, therefore we are  unable to provide suggestions for treatment or follow-up.  -------------------------------  DISCLAIMER  These assessments and treatment suggestions are provided as  a convenience in support of the physician-patient  relationship and are not intended to replace the physician's  clinical judgment. They are derived from national guidelines  in addition to other evidence and expert opinion. The  clinician should consider this information within the  context of clinical opinion and the individual patient.  SEE GUIDANCE FOR CHRONIC KIDNEY DISEASE PROGRAM: Kidney  Disease Improving Global Outcomes (KDIGO) clinical practice  guidelines are at  http://kdigo.org/home/guidelines/.  [employer] Kidney Disease Outcomes Quality  Initiative (KDOQI (TM)), with its                            limitations and  disclaimers, are at www.kidney.org/professionals/KDOQI. This  program is intended for patients who have been diagnosed  with stages 3, 4, or pre-dialysis 5 CKD. It is not intended  for children, pregnant patients, or transplant patients.      Interpretation 09/13/2023 Note   Final    Supplemental report is available.     Assessment & Plan   Diagnoses and all orders for this visit:    1. Type 2 diabetes mellitus without complication, without long-term current use of insulin (Primary)  -     Comprehensive Metabolic Panel  -     Hemoglobin A1c  -     TSH  -     C-Peptide  -     Glutamic Acid Decarboxylase    2. Hyperlipidemia, unspecified hyperlipidemia type  -     Lipid Panel    3. Primary hypertension  -     Comprehensive Metabolic Panel    4. Vitamin D deficiency  -     Vitamin D,25-Hydroxy    5. Hypercalcemia  -     Comprehensive Metabolic Panel  -     Vitamin D,25-Hydroxy  -     PTH, Intact  -     Phosphorus    6. History of kidney stones    7. Statin intolerance      Continue Jardiance 10 mg/day, Actos 30 mg/day, and Ozempic 2 mg weekly.  Check hemoglobin A1c, C-peptide and SYLWIA antibodies.    Patient has statin intolerance and intolerant of Repatha.  Consider using Leqvio.  Printed information on Leqvio was given to the patient.    Check serum calcium, PTH, phosphorus, and vitamin D levels.  Suggest bone density.    Copy of my note sent to Dr. Brock Gryason.    RTC 4 mos with RAFI Grayson NP.

## 2023-12-13 LAB
25(OH)D3+25(OH)D2 SERPL-MCNC: 57.7 NG/ML (ref 30–100)
ALBUMIN SERPL-MCNC: 4.7 G/DL (ref 3.9–4.9)
ALBUMIN/GLOB SERPL: 2 {RATIO} (ref 1.2–2.2)
ALP SERPL-CCNC: 89 IU/L (ref 44–121)
ALT SERPL-CCNC: 14 IU/L (ref 0–32)
AST SERPL-CCNC: 18 IU/L (ref 0–40)
BILIRUB SERPL-MCNC: 0.3 MG/DL (ref 0–1.2)
BUN SERPL-MCNC: 18 MG/DL (ref 8–27)
BUN/CREAT SERPL: 19 (ref 12–28)
C PEPTIDE SERPL-MCNC: 4.1 NG/ML (ref 1.1–4.4)
CALCIUM SERPL-MCNC: 10.3 MG/DL (ref 8.7–10.3)
CHLORIDE SERPL-SCNC: 97 MMOL/L (ref 96–106)
CHOLEST SERPL-MCNC: 216 MG/DL (ref 100–199)
CO2 SERPL-SCNC: 23 MMOL/L (ref 20–29)
CREAT SERPL-MCNC: 0.95 MG/DL (ref 0.57–1)
EGFRCR SERPLBLD CKD-EPI 2021: 66 ML/MIN/1.73
GAD65 AB SER IA-ACNC: <5 U/ML (ref 0–5)
GLOBULIN SER CALC-MCNC: 2.3 G/DL (ref 1.5–4.5)
GLUCOSE SERPL-MCNC: 94 MG/DL (ref 70–99)
HBA1C MFR BLD: 6.3 % (ref 4.8–5.6)
HDLC SERPL-MCNC: 61 MG/DL
IMP & REVIEW OF LAB RESULTS: NORMAL
LDLC SERPL CALC-MCNC: 126 MG/DL (ref 0–99)
PHOSPHATE SERPL-MCNC: 3.8 MG/DL (ref 3–4.3)
POTASSIUM SERPL-SCNC: 4 MMOL/L (ref 3.5–5.2)
PROT SERPL-MCNC: 7 G/DL (ref 6–8.5)
PTH-INTACT SERPL-MCNC: 25 PG/ML (ref 15–65)
SODIUM SERPL-SCNC: 139 MMOL/L (ref 134–144)
TRIGL SERPL-MCNC: 163 MG/DL (ref 0–149)
TSH SERPL DL<=0.005 MIU/L-ACNC: 1.93 UIU/ML (ref 0.45–4.5)
VLDLC SERPL CALC-MCNC: 29 MG/DL (ref 5–40)

## 2023-12-14 NOTE — PROGRESS NOTES
Hemoglobin A1c 6.3%.  Diabetes is well-controlled.  LDL lower at 126.  HDL 61.  Nonfasting triglycerides 163.  Consider Leqvio  Normal thyroid function tests.  Normal vitamin D.  Patient is off vitamin D supplements.  Normal serum calcium.  Normal PTH.  Send copy of labs to Dr. Brock Grayson.  Copy of labs sent to patient through [employer].

## 2023-12-15 ENCOUNTER — TELEPHONE (OUTPATIENT)
Dept: ENDOCRINOLOGY | Age: 68
End: 2023-12-15
Payer: COMMERCIAL

## 2023-12-15 NOTE — TELEPHONE ENCOUNTER
12/15 called and lm for pt to call reg her labs   Ok for hub to read to patient   Please schedule labs if needed or follow ups  Ok for  to read to patient   Please schedule labs if needed or follow ups       ----- Message from Dominic Martinez MD sent at 12/14/2023  5:17 PM EST -----  Hemoglobin A1c 6.3%.  Diabetes is well-controlled.  LDL lower at 126.  HDL 61.  Nonfasting triglycerides 163.  Consider Leqvio  Normal thyroid function tests.  Normal vitamin D.  Patient is off vitamin D supplements.  Normal serum calcium.  Normal PTH.  Send copy of labs to Dr. Brock Grayson.  Copy of labs sent to patient through [employer].

## 2024-03-13 DIAGNOSIS — N18.31 TYPE 2 DIABETES MELLITUS WITH STAGE 3A CHRONIC KIDNEY DISEASE, WITHOUT LONG-TERM CURRENT USE OF INSULIN: ICD-10-CM

## 2024-03-13 DIAGNOSIS — E11.22 TYPE 2 DIABETES MELLITUS WITH STAGE 3A CHRONIC KIDNEY DISEASE, WITHOUT LONG-TERM CURRENT USE OF INSULIN: ICD-10-CM

## 2024-03-13 RX ORDER — PIOGLITAZONEHYDROCHLORIDE 30 MG/1
30 TABLET ORAL DAILY
Qty: 90 TABLET | Refills: 1 | Status: SHIPPED | OUTPATIENT
Start: 2024-03-13

## 2024-03-13 RX ORDER — EMPAGLIFLOZIN 10 MG/1
10 TABLET, FILM COATED ORAL EVERY MORNING
Qty: 90 TABLET | Refills: 1 | Status: SHIPPED | OUTPATIENT
Start: 2024-03-13

## 2024-03-13 NOTE — TELEPHONE ENCOUNTER
Rx Refill Note  Requested Prescriptions     Pending Prescriptions Disp Refills    Jardiance 10 MG tablet tablet [Pharmacy Med Name: JARDIANCE 10 MG TABLET] 90 tablet 1     Sig: TAKE 1 TABLET BY MOUTH EVERY DAY IN THE MORNING    pioglitazone (ACTOS) 30 MG tablet [Pharmacy Med Name: PIOGLITAZONE HCL 30 MG TABLET] 90 tablet 1     Sig: TAKE 1 TABLET BY MOUTH EVERY DAY      Last office visit with prescribing clinician: 9/13/2023   Last telemedicine visit with prescribing clinician: Visit date not found   Next office visit with prescribing clinician: 4/11/2024                         Would you like a call back once the refill request has been completed: [] Yes [] No    If the office needs to give you a call back, can they leave a voicemail: [] Yes [] No    Evangelina Campos  03/13/24, 10:06 EDT

## 2024-04-11 ENCOUNTER — OFFICE VISIT (OUTPATIENT)
Dept: ENDOCRINOLOGY | Age: 69
End: 2024-04-11
Payer: COMMERCIAL

## 2024-04-11 VITALS
DIASTOLIC BLOOD PRESSURE: 72 MMHG | TEMPERATURE: 98.1 F | BODY MASS INDEX: 18.5 KG/M2 | SYSTOLIC BLOOD PRESSURE: 116 MMHG | HEART RATE: 86 BPM | HEIGHT: 63 IN | WEIGHT: 104.4 LBS

## 2024-04-11 DIAGNOSIS — I12.9 HYPERTENSIVE KIDNEY DISEASE WITH STAGE 3A CHRONIC KIDNEY DISEASE: ICD-10-CM

## 2024-04-11 DIAGNOSIS — E11.22 TYPE 2 DIABETES MELLITUS WITH STAGE 3A CHRONIC KIDNEY DISEASE, WITHOUT LONG-TERM CURRENT USE OF INSULIN: Primary | ICD-10-CM

## 2024-04-11 DIAGNOSIS — E55.9 VITAMIN D DEFICIENCY: ICD-10-CM

## 2024-04-11 DIAGNOSIS — N18.31 TYPE 2 DIABETES MELLITUS WITH STAGE 3A CHRONIC KIDNEY DISEASE, WITHOUT LONG-TERM CURRENT USE OF INSULIN: Primary | ICD-10-CM

## 2024-04-11 DIAGNOSIS — E78.5 HYPERLIPIDEMIA, UNSPECIFIED HYPERLIPIDEMIA TYPE: ICD-10-CM

## 2024-04-11 DIAGNOSIS — N18.31 HYPERTENSIVE KIDNEY DISEASE WITH STAGE 3A CHRONIC KIDNEY DISEASE: ICD-10-CM

## 2024-04-11 DIAGNOSIS — E21.3 HYPERPARATHYROIDISM: ICD-10-CM

## 2024-04-11 LAB
ALBUMIN SERPL-MCNC: 4.5 G/DL (ref 3.5–5.2)
ALBUMIN/GLOB SERPL: 2.1 G/DL
ALP SERPL-CCNC: 89 U/L (ref 39–117)
ALT SERPL-CCNC: 15 U/L (ref 1–33)
AST SERPL-CCNC: 16 U/L (ref 1–32)
BILIRUB SERPL-MCNC: 0.3 MG/DL (ref 0–1.2)
BUN SERPL-MCNC: 20 MG/DL (ref 8–23)
BUN/CREAT SERPL: 20.6 (ref 7–25)
CALCIUM SERPL-MCNC: 10.6 MG/DL (ref 8.6–10.5)
CHLORIDE SERPL-SCNC: 99 MMOL/L (ref 98–107)
CHOLEST SERPL-MCNC: 193 MG/DL (ref 0–200)
CO2 SERPL-SCNC: 29.3 MMOL/L (ref 22–29)
CREAT SERPL-MCNC: 0.97 MG/DL (ref 0.57–1)
EGFRCR SERPLBLD CKD-EPI 2021: 64.6 ML/MIN/1.73
GLOBULIN SER CALC-MCNC: 2.1 GM/DL
GLUCOSE SERPL-MCNC: 115 MG/DL (ref 65–99)
HBA1C MFR BLD: 5.8 % (ref 4.8–5.6)
HDLC SERPL-MCNC: 63 MG/DL (ref 40–60)
IMP & REVIEW OF LAB RESULTS: NORMAL
LDLC SERPL CALC-MCNC: 111 MG/DL (ref 0–100)
POTASSIUM SERPL-SCNC: 4.2 MMOL/L (ref 3.5–5.2)
PROT SERPL-MCNC: 6.6 G/DL (ref 6–8.5)
REPORT: NORMAL
SODIUM SERPL-SCNC: 139 MMOL/L (ref 136–145)
TRIGL SERPL-MCNC: 106 MG/DL (ref 0–150)
VLDLC SERPL CALC-MCNC: 19 MG/DL (ref 5–40)

## 2024-04-11 NOTE — PROGRESS NOTES
Chief Complaint  Chief Complaint   Patient presents with    Diabetes     Type 2.       Subjective          History of Present Illness    Paz Perera 68 y.o.  presents for a follow-up evaluation for type 2 DM     She has been diabetic since 2017     Pt is on the following medications for their DM: Jardiance 10 mg daily, Actos 30 mg daily and Ozempic 2 mg weekly        Jardiance was decreased from 25 mg to 10 mg to help with vaginal yeast infections   Januvia stopped once GLP1 started  Metformin caused diarrhea and upset stomach       Pt complains of thinning hair constipation    Denies nausea, diarrhea, constipation, chest pain, shortness of breath, vision changes or numbness and tingling in feet/hands.    Weight loss of 8 lbs since last visit.    Pt denies a history of diabetic retinopathy.  Last eye exam was 07/23    Pt does have a history of nephropathy, CKD Stage 3a.  Patient is currently taking ARB     Pt denies neuropathy.    Pt denies a history of CAD or CVA.    Last A1C in 12/23 was 6.3    Last microalbumin in 09/23 was normal          Blood Sugars    Blood glucoses are checked couple times a week.    Fasting blood glucoses: 100s-110s    Pt has no episodes of hypoglycemia.            Hyperlipidemia     Pt is currently taking taking nothing  Repatha stopped due to joint pain  Lipitor and Zetia stopped due to myalgia - she has refuses any other statin therapy    Last lipid panel in 12/23 showed Total 216, HDL 61,  and Triglycerides 163            Hypertension with CKD stage 3a     Pt denies any chest pain, palpitations, shortness of breath, or headache     Current regimen includes olmesartan 20 mg daily  She is on spironolactone 25 mg daily for acne prescribed by her PCP.                     Vitamin D Deficiency     Current treatment includes nothing - taken off due to high levels     Last Vit D level was 57.7 in 12/23                  Hyperparathyroidism     Calcium has been elevated on and off  "throughout the years, with last calcium of 10.5 in 03/22.  Pt states that she doesn't take a supplement and itsn't on thiazide.  Vitamin D levels are normal.  PTH was normal at 25, as was ionized calcium at 5.5 in 03/22.      24 hour urine showed hyperparathyroidism     Last calcium in 12/23 was 10.3  Last PTH in 12/23 was 25     Never got parathyroid scan that was ordered     Never got DEXA scan             I have reviewed the patient's allergies, medicines, past medical hx, family hx and social hx.    Objective   Vital Signs:   /72 (BP Location: Left arm, Patient Position: Sitting)   Pulse 86   Temp 98.1 °F (36.7 °C)   Ht 160 cm (62.99\")   Wt 47.4 kg (104 lb 6.4 oz)   BMI 18.50 kg/m²       Physical Exam   Physical Exam  Constitutional:       General: She is not in acute distress.     Appearance: Normal appearance. She is not diaphoretic.   HENT:      Head: Normocephalic and atraumatic.   Eyes:      General:         Right eye: No discharge.         Left eye: No discharge.   Skin:     General: Skin is warm and dry.   Neurological:      Mental Status: She is alert.   Psychiatric:         Mood and Affect: Mood normal.         Behavior: Behavior normal.                    Results Review:   Hemoglobin A1C   Date Value Ref Range Status   12/11/2023 6.3 (H) 4.8 - 5.6 % Final     Comment:              Prediabetes: 5.7 - 6.4           Diabetes: >6.4           Glycemic control for adults with diabetes: <7.0     08/25/2021 7.70 (H) 4.80 - 5.60 % Final     Total Cholesterol   Date Value Ref Range Status   08/25/2021 257 (H) 0 - 200 mg/dL Final     Triglycerides   Date Value Ref Range Status   12/11/2023 163 (H) 0 - 149 mg/dL Final   08/25/2021 156 (H) 0 - 150 mg/dL Final     HDL Cholesterol   Date Value Ref Range Status   12/11/2023 61 >39 mg/dL Final   08/25/2021 60 40 - 60 mg/dL Final     LDL Chol Calc (NIH)   Date Value Ref Range Status   12/11/2023 126 (H) 0 - 99 mg/dL Final     VLDL Cholesterol Manoj   Date Value " Ref Range Status   12/11/2023 29 5 - 40 mg/dL Final     LDL/HDL Ratio   Date Value Ref Range Status   08/25/2021 2.76  Final         Assessment and Plan {CC Problem List  Visit Diagnosis  ROS  Review (Popup)  Health Maintenance  Quality  BestPractice  Medications  SmartSets  SnapShot Encounters  Media :23  Diagnoses and all orders for this visit:    1. Type 2 diabetes mellitus with stage 3a chronic kidney disease, without long-term current use of insulin (Primary)  -     Hemoglobin A1c  -     Comprehensive Metabolic Panel    Check labs today  Continue with Jardiance 10 mg daily  Continue with Actos 30 mg daily   Continue with Ozempic 2 mg weekly  Continue with BG checks      2. Hyperlipidemia, unspecified hyperlipidemia type  -     Comprehensive Metabolic Panel  -     Lipid Panel    Check labs today  Continue with dietary modification  Statin, Zetia and Repatha intolerant      3. Hypertensive kidney disease with stage 3a chronic kidney disease  -     Comprehensive Metabolic Panel    Stable  Continue with current medication regimen  Defer management to PCP       4. Hyperparathyroidism  -     Comprehensive Metabolic Panel    Check labs      5. Vitamin D deficiency    Continue off supplement        No refills needed at this time      Labs today  RTC in 4 months with me and 8 months with Dr. Martinez      Follow Up     Patient was given instructions and counseling regarding her condition or for health maintenance advice. Please see specific information pulled into the AVS if appropriate.                Shruthi Grayson, LUIS  04/11/24

## 2024-05-22 NOTE — TELEPHONE ENCOUNTER
Rx Refill Note  Requested Prescriptions     Pending Prescriptions Disp Refills    Ozempic, 2 MG/DOSE, 8 MG/3ML solution pen-injector [Pharmacy Med Name: Ozempic (2 MG/DOSE) 8 MG/3ML Subcutaneous Solution Pen-injector] 18 mL 0     Sig: INJECT 2 MG SUBCUTANEOUSLY ONCE A WEEK      Last office visit with prescribing clinician: 4/11/2024   Last telemedicine visit with prescribing clinician: Visit date not found   Next office visit with prescribing clinician: 8/28/2024                         Would you like a call back once the refill request has been completed: [] Yes [] No    If the office needs to give you a call back, can they leave a voicemail: [] Yes [] No    Evangelina Campos  05/22/24, 15:41 EDT

## 2024-05-23 RX ORDER — SEMAGLUTIDE 2.68 MG/ML
INJECTION, SOLUTION SUBCUTANEOUS
Qty: 18 ML | Refills: 0 | Status: SHIPPED | OUTPATIENT
Start: 2024-05-23

## 2024-05-23 RX ORDER — SEMAGLUTIDE 2.68 MG/ML
2 INJECTION, SOLUTION SUBCUTANEOUS WEEKLY
Qty: 9 ML | Refills: 1 | OUTPATIENT
Start: 2024-05-23

## 2024-05-23 NOTE — TELEPHONE ENCOUNTER
Rx Refill Note  Requested Prescriptions     Pending Prescriptions Disp Refills    Semaglutide, 2 MG/DOSE, (Ozempic, 2 MG/DOSE,) 8 MG/3ML solution pen-injector 9 mL 1     Sig: Inject 2 mg under the skin into the appropriate area as directed 1 (One) Time Per Week.      Last office visit with prescribing clinician: 4/11/2024   Last telemedicine visit with prescribing clinician: Visit date not found   Next office visit with prescribing clinician: 8/28/2024                         Would you like a call back once the refill request has been completed: [] Yes [] No    If the office needs to give you a call back, can they leave a voicemail: [] Yes [] No    Evangelina Campos  05/23/24, 10:04 EDT

## 2024-05-24 NOTE — TELEPHONE ENCOUNTER
PATIENT CALLED BACK TO CHECK ON STATUS OF REFILL FOR OZEMPIC. SHE STATES THAT SHE IS DOWN TO HER LAST DOSE. PLEASE CALL BACK TO ADVISE.

## 2024-08-28 ENCOUNTER — OFFICE VISIT (OUTPATIENT)
Dept: ENDOCRINOLOGY | Age: 69
End: 2024-08-28
Payer: COMMERCIAL

## 2024-08-28 VITALS
DIASTOLIC BLOOD PRESSURE: 82 MMHG | WEIGHT: 108.2 LBS | TEMPERATURE: 97.1 F | HEIGHT: 63 IN | BODY MASS INDEX: 19.17 KG/M2 | HEART RATE: 85 BPM | OXYGEN SATURATION: 100 % | SYSTOLIC BLOOD PRESSURE: 122 MMHG

## 2024-08-28 DIAGNOSIS — I12.9 HYPERTENSIVE KIDNEY DISEASE WITH STAGE 3A CHRONIC KIDNEY DISEASE: ICD-10-CM

## 2024-08-28 DIAGNOSIS — N18.31 HYPERTENSIVE KIDNEY DISEASE WITH STAGE 3A CHRONIC KIDNEY DISEASE: ICD-10-CM

## 2024-08-28 DIAGNOSIS — E78.5 HYPERLIPIDEMIA, UNSPECIFIED HYPERLIPIDEMIA TYPE: ICD-10-CM

## 2024-08-28 DIAGNOSIS — E55.9 VITAMIN D DEFICIENCY: ICD-10-CM

## 2024-08-28 DIAGNOSIS — N18.31 TYPE 2 DIABETES MELLITUS WITH STAGE 3A CHRONIC KIDNEY DISEASE, WITHOUT LONG-TERM CURRENT USE OF INSULIN: Primary | ICD-10-CM

## 2024-08-28 DIAGNOSIS — E11.22 TYPE 2 DIABETES MELLITUS WITH STAGE 3A CHRONIC KIDNEY DISEASE, WITHOUT LONG-TERM CURRENT USE OF INSULIN: Primary | ICD-10-CM

## 2024-08-28 DIAGNOSIS — E21.3 HYPERPARATHYROIDISM: ICD-10-CM

## 2024-08-28 RX ORDER — GABAPENTIN 300 MG/1
CAPSULE ORAL
COMMUNITY
Start: 2024-07-15

## 2024-08-28 NOTE — PROGRESS NOTES
Chief Complaint  Chief Complaint   Patient presents with    Diabetes     Type 2 : Pt doesn't have meter today, doesn't check bs regularly, is up to date on eye exam, no hx of retinopathy or neuropathy.         Subjective          History of Present Illness    Paz Perera 69 y.o. presents for a follow-up for Type 2 Diabetes    She was diagnosed with diabetes in 2017    Pt is on the following medications for their diabetes:  Jardiance 10 mg daily, pioglitazone 30 mg daily and Ozempic 2 mg weekly        Jardiance was decreased from 25 mg to 10 mg to help with vaginal yeast infections   Januvia stopped once GLP1 started  Metformin caused diarrhea and upset stomach       Denies diarrhea, constipation, chest pain, shortness of breath, vision changes or numbness and tingling in feet/hands.    Weight gain of 4 lbs since last visit.    Pt denies a history of diabetic retinopathy.  Last eye exam was 2023     Pt does have nephropathy, CKD Stage 3a.  Patient is currently taking ARB      Pt denies neuropathy.     Pt denies a history of CAD or CVA.    Last A1C in 04/24 was 5.8    Last  microalbumin in 09/23 was normal          Blood Sugars    Blood glucoses are checked couple times a week.    Random blood glucoses: 100-140    Pt denies episodes of hypoglycemia.            Hyperlipidemia     Pt is currently taking nothing  Repatha stopped due to joint pain  Lipitor and Zetia stopped due to myalgia - she has refuses any other statin therapy    Last lipid panel in 04/24 showed Total 193, HDL 63,  and Triglycerides 106            Hypertension with CKD stage 3a     Pt denies any chest pain, palpitations, shortness of breath, or headache     Current regimen includes olmesartan 20 mg daily  She is on spironolactone 25 mg daily for acne prescribed by her PCP.                     Vitamin D Deficiency     Current treatment includes nothing - taken off due to high levels     Last Vit D level was 57.7 in 12/23                 "  Hyperparathyroidism     Calcium has been elevated on and off throughout the years, with last calcium of 10.5 in 03/22.  Pt states that she doesn't take a supplement and itsn't on thiazide.  Vitamin D levels are normal.  PTH was normal at 25, as was ionized calcium at 5.5 in 03/22.      24 hour urine showed hyperparathyroidism     Last calcium in 04/24 was 10.6  Last PTH in 12/23 was 25     Never got parathyroid scan that was ordered     Never got DEXA scan             I have reviewed the patient's allergies, medicines, past medical hx, family hx and social hx.    Objective   Vital Signs:   /82   Pulse 85   Temp 97.1 °F (36.2 °C) (Temporal)   Ht 160 cm (62.99\")   Wt 49.1 kg (108 lb 3.2 oz)   SpO2 100%   BMI 19.17 kg/m²       Physical Exam   Physical Exam  Constitutional:       General: She is not in acute distress.     Appearance: Normal appearance. She is not diaphoretic.   HENT:      Head: Normocephalic and atraumatic.   Eyes:      General:         Right eye: No discharge.         Left eye: No discharge.   Skin:     General: Skin is warm and dry.   Neurological:      Mental Status: She is alert.   Psychiatric:         Mood and Affect: Mood normal.         Behavior: Behavior normal.                    Results Review:   Hemoglobin A1C   Date Value Ref Range Status   04/11/2024 5.80 (H) 4.80 - 5.60 % Final     Comment:     Hemoglobin A1C Ranges:  Increased Risk for Diabetes  5.7% to 6.4%  Diabetes                     >= 6.5%  Diabetic Goal                < 7.0%     08/25/2021 7.70 (H) 4.80 - 5.60 % Final     Total Cholesterol   Date Value Ref Range Status   08/25/2021 257 (H) 0 - 200 mg/dL Final     Triglycerides   Date Value Ref Range Status   04/11/2024 106 0 - 150 mg/dL Final   08/25/2021 156 (H) 0 - 150 mg/dL Final     HDL Cholesterol   Date Value Ref Range Status   04/11/2024 63 (H) 40 - 60 mg/dL Final   08/25/2021 60 40 - 60 mg/dL Final     LDL Chol Calc (NIH)   Date Value Ref Range Status "   04/11/2024 111 (H) 0 - 100 mg/dL Final     VLDL Cholesterol Manoj   Date Value Ref Range Status   04/11/2024 19 5 - 40 mg/dL Final     LDL/HDL Ratio   Date Value Ref Range Status   08/25/2021 2.76  Final         Assessment and Plan {CC Problem List  Visit Diagnosis  ROS  Review (Popup)  Health Maintenance  Quality  BestPractice  Medications  SmartSets  SnapShot Encounters  Media :23  Diagnoses and all orders for this visit:    1. Type 2 diabetes mellitus with stage 3a chronic kidney disease, without long-term current use of insulin (Primary)  -     Hemoglobin A1c  -     Comprehensive Metabolic Panel  -     Microalbumin / Creatinine Urine Ratio - Urine, Clean Catch    Check labs today  Continue with Jardiance 10 mg daily  Continue with Actos 30 mg daily   Continue with Ozempic 2 mg weekly  Continue with BG checks      2. Hyperlipidemia, unspecified hyperlipidemia type  -     Comprehensive Metabolic Panel    Continue with dietary modification  Statin, Zetia and Repatha intolerant      3. Hypertensive kidney disease with stage 3a chronic kidney disease  -     Comprehensive Metabolic Panel    Stable  Continue with current medication regimen  Defer management to PCP       4. Vitamin D deficiency  -     Comprehensive Metabolic Panel  -     Vitamin D,25-Hydroxy    Check labs today      5. Hyperparathyroidism  -     Comprehensive Metabolic Panel      Check labs today          No refills needed at this time      Labs today  RTC on 12/11/24 with Dr. Martinez      Follow Up     Patient was given instructions and counseling regarding her condition or for health maintenance advice. Please see specific information pulled into the AVS if appropriate.                Shruthi Grayson, LUIS  08/28/24

## 2024-08-29 LAB
25(OH)D3+25(OH)D2 SERPL-MCNC: 64.2 NG/ML (ref 30–100)
ALBUMIN SERPL-MCNC: 4.5 G/DL (ref 3.5–5.2)
ALBUMIN/CREAT UR: 8 MG/G CREAT (ref 0–29)
ALBUMIN/GLOB SERPL: 2 G/DL
ALP SERPL-CCNC: 81 U/L (ref 39–117)
ALT SERPL-CCNC: 12 U/L (ref 1–33)
AST SERPL-CCNC: 19 U/L (ref 1–32)
BILIRUB SERPL-MCNC: 0.2 MG/DL (ref 0–1.2)
BUN SERPL-MCNC: 26 MG/DL (ref 8–23)
BUN/CREAT SERPL: 28 (ref 7–25)
CALCIUM SERPL-MCNC: 10.2 MG/DL (ref 8.6–10.5)
CHLORIDE SERPL-SCNC: 100 MMOL/L (ref 98–107)
CO2 SERPL-SCNC: 29.5 MMOL/L (ref 22–29)
CREAT SERPL-MCNC: 0.93 MG/DL (ref 0.57–1)
CREAT UR-MCNC: 70.4 MG/DL
EGFRCR SERPLBLD CKD-EPI 2021: 67.5 ML/MIN/1.73
GLOBULIN SER CALC-MCNC: 2.2 GM/DL
GLUCOSE SERPL-MCNC: 92 MG/DL (ref 65–99)
HBA1C MFR BLD: 5.8 % (ref 4.8–5.6)
MICROALBUMIN UR-MCNC: 5.9 UG/ML
POTASSIUM SERPL-SCNC: 4.5 MMOL/L (ref 3.5–5.2)
PROT SERPL-MCNC: 6.7 G/DL (ref 6–8.5)
REPORT: NORMAL
SODIUM SERPL-SCNC: 139 MMOL/L (ref 136–145)

## 2024-09-08 DIAGNOSIS — E11.22 TYPE 2 DIABETES MELLITUS WITH STAGE 3A CHRONIC KIDNEY DISEASE, WITHOUT LONG-TERM CURRENT USE OF INSULIN: ICD-10-CM

## 2024-09-08 DIAGNOSIS — N18.31 TYPE 2 DIABETES MELLITUS WITH STAGE 3A CHRONIC KIDNEY DISEASE, WITHOUT LONG-TERM CURRENT USE OF INSULIN: ICD-10-CM

## 2024-09-09 RX ORDER — PIOGLITAZONEHYDROCHLORIDE 30 MG/1
30 TABLET ORAL DAILY
Qty: 90 TABLET | Refills: 1 | Status: SHIPPED | OUTPATIENT
Start: 2024-09-09

## 2024-09-09 NOTE — TELEPHONE ENCOUNTER
Rx Refill Note  Requested Prescriptions     Pending Prescriptions Disp Refills    pioglitazone (ACTOS) 30 MG tablet [Pharmacy Med Name: PIOGLITAZONE HCL 30 MG TABLET] 90 tablet 1     Sig: TAKE 1 TABLET BY MOUTH EVERY DAY      Last office visit with prescribing clinician: 12/11/2023   Last telemedicine visit with prescribing clinician: Visit date not found   Next office visit with prescribing clinician: 12/11/2024                         Would you like a call back once the refill request has been completed: [] Yes [] No    If the office needs to give you a call back, can they leave a voicemail: [] Yes [] No    Ofelia Campos MA  09/09/24, 07:43 EDT

## 2024-10-13 DIAGNOSIS — E11.22 TYPE 2 DIABETES MELLITUS WITH STAGE 3A CHRONIC KIDNEY DISEASE, WITHOUT LONG-TERM CURRENT USE OF INSULIN: ICD-10-CM

## 2024-10-13 DIAGNOSIS — N18.31 TYPE 2 DIABETES MELLITUS WITH STAGE 3A CHRONIC KIDNEY DISEASE, WITHOUT LONG-TERM CURRENT USE OF INSULIN: ICD-10-CM

## 2024-10-14 RX ORDER — EMPAGLIFLOZIN 10 MG/1
10 TABLET, FILM COATED ORAL EVERY MORNING
Qty: 90 TABLET | Refills: 1 | Status: SHIPPED | OUTPATIENT
Start: 2024-10-14

## 2024-10-14 NOTE — TELEPHONE ENCOUNTER
Rx Refill Note  Requested Prescriptions     Pending Prescriptions Disp Refills    Jardiance 10 MG tablet tablet [Pharmacy Med Name: JARDIANCE 10 MG TABLET] 90 tablet 1     Sig: TAKE 1 TABLET BY MOUTH EVERY DAY IN THE MORNING      Last office visit with prescribing clinician: 12/11/2023   Last telemedicine visit with prescribing clinician: Visit date not found   Next office visit with prescribing clinician: 12/11/2024                         Would you like a call back once the refill request has been completed: [] Yes [] No    If the office needs to give you a call back, can they leave a voicemail: [] Yes [] No    Ofelia Campos MA  10/14/24, 07:52 EDT

## 2024-10-26 DIAGNOSIS — N18.31 TYPE 2 DIABETES MELLITUS WITH STAGE 3A CHRONIC KIDNEY DISEASE, WITHOUT LONG-TERM CURRENT USE OF INSULIN: Primary | ICD-10-CM

## 2024-10-26 DIAGNOSIS — E11.22 TYPE 2 DIABETES MELLITUS WITH STAGE 3A CHRONIC KIDNEY DISEASE, WITHOUT LONG-TERM CURRENT USE OF INSULIN: Primary | ICD-10-CM

## 2024-10-28 RX ORDER — SEMAGLUTIDE 2.68 MG/ML
2 INJECTION, SOLUTION SUBCUTANEOUS WEEKLY
Qty: 9 ML | Refills: 1 | Status: SHIPPED | OUTPATIENT
Start: 2024-10-28

## 2024-10-28 NOTE — TELEPHONE ENCOUNTER
Rx Refill Note  Requested Prescriptions     Pending Prescriptions Disp Refills    Ozempic, 2 MG/DOSE, 8 MG/3ML solution pen-injector [Pharmacy Med Name: Ozempic (2 MG/DOSE) 8 MG/3ML Subcutaneous Solution Pen-injector] 18 mL 0     Sig: INJECT 2 MG INTO THE SKIN ONCE WEEKLY      Last office visit with prescribing clinician: 8/28/2024   Last telemedicine visit with prescribing clinician: Visit date not found   Next office visit with prescribing clinician: 12/11/24                        Would you like a call back once the refill request has been completed: [] Yes [] No    If the office needs to give you a call back, can they leave a voicemail: [] Yes [] No    Libia Ramos MA  10/28/24, 09:16 EDT

## 2024-12-11 ENCOUNTER — OFFICE VISIT (OUTPATIENT)
Dept: ENDOCRINOLOGY | Age: 69
End: 2024-12-11
Payer: COMMERCIAL

## 2024-12-11 VITALS
TEMPERATURE: 98.2 F | BODY MASS INDEX: 18.82 KG/M2 | HEART RATE: 90 BPM | OXYGEN SATURATION: 98 % | SYSTOLIC BLOOD PRESSURE: 120 MMHG | DIASTOLIC BLOOD PRESSURE: 60 MMHG | WEIGHT: 106.2 LBS

## 2024-12-11 DIAGNOSIS — E83.52 HYPERCALCEMIA: ICD-10-CM

## 2024-12-11 DIAGNOSIS — E11.9 TYPE 2 DIABETES MELLITUS WITHOUT COMPLICATION, WITHOUT LONG-TERM CURRENT USE OF INSULIN: Primary | ICD-10-CM

## 2024-12-11 DIAGNOSIS — I10 PRIMARY HYPERTENSION: ICD-10-CM

## 2024-12-11 DIAGNOSIS — E55.9 VITAMIN D DEFICIENCY: ICD-10-CM

## 2024-12-11 DIAGNOSIS — E78.5 HYPERLIPIDEMIA, UNSPECIFIED HYPERLIPIDEMIA TYPE: ICD-10-CM

## 2024-12-11 DIAGNOSIS — Z78.9 STATIN INTOLERANCE: ICD-10-CM

## 2024-12-11 DIAGNOSIS — Z87.442 HISTORY OF KIDNEY STONES: ICD-10-CM

## 2024-12-11 NOTE — PROGRESS NOTES
Royer Perera is a 69 y.o. female.     History of Present Illness     She has known diabetes mellitus since .  She is on Jardiance 10 mg once a day, Actos 30 mg once a day, and Ozempic 2 mg weekly.  She does not check her blood sugars.  She denies hypoglycemia.  She had diarrhea with metformin in the past.  She was on Januvia without side effects in the past.  She has lost 2 pounds since 2024.  Her last meal was at 7:45 AM (coffee with creamer).     Urine microalbumin was normal in .  She had an eye exam in 2022 and she has no retinopathy.  She denies numbness or tingling in her hands or feet.     She has hyperlipidemia and is on diet alone.  She had arthralgia with Repatha before.  She had myalgia with Lipitor and Zetia in the past.  She has not used Praluent or Nexletol in the past.     She has hypertension and is on olmesartan 20 mg/day.  She is on spironolactone 25 mg daily for acne prescribed by her PCP.  She has no history of heart attack or stroke.  She denies chest pain or shortness of breath.     She has vitamin D deficiency and is off vitamin D3 2000 units/day.     She is  3 para 3.  She had natural menopause in her 50s.  She was never on hormone replacement therapy.  She has never had a bone density.  She denies alcohol or tobacco abuse.  She does not exercise regularly.  She did not get her bone density scheduled because she has not met her deductible.     She has intermittent hypercalcemia since 2019.  Her highest serum calcium was 11.0 mg per DL.  24 urine calcium done in 2022 is 134 mg per 24 hours.     She had kidney stones 3 times in the past and had lithotripsy.  She last passed a stone more than 20 years ago.  She has 1 child with history of kidney stones.     She is adopted and does not know her family's history.     She has not had a vaginal yeast infection since Jardiance was reduced to 10 mg daily.    She had flu vaccine this fall.  She  denies alcohol or tobacco use.    The following portions of the patient's history were reviewed and updated as appropriate: allergies, current medications, past family history, past medical history, past social history, past surgical history, and problem list.    Review of Systems   Respiratory:  Negative for shortness of breath.    Cardiovascular:  Negative for chest pain and palpitations.   Gastrointestinal: Negative.    Genitourinary: Negative.    Musculoskeletal:  Negative for myalgias.   Neurological:  Negative for numbness.     Vitals:    12/11/24 1125   BP: 120/60   Pulse: 90   Temp: 98.2 °F (36.8 °C)   TempSrc: Oral   SpO2: 98%   Weight: 48.2 kg (106 lb 3.2 oz)      Objective   Physical Exam  Constitutional:       Appearance: Normal appearance. She is not toxic-appearing or diaphoretic.   Eyes:      General: No scleral icterus.        Right eye: No discharge.         Left eye: No discharge.      Extraocular Movements: Extraocular movements intact.      Conjunctiva/sclera: Conjunctivae normal.   Neck:      Vascular: No carotid bruit.   Cardiovascular:      Rate and Rhythm: Normal rate and regular rhythm.      Heart sounds: Normal heart sounds. No murmur heard.     No gallop.   Pulmonary:      Breath sounds: No rales.   Chest:      Chest wall: No tenderness.   Abdominal:      General: Bowel sounds are normal.      Palpations: Abdomen is soft.      Tenderness: There is no right CVA tenderness or left CVA tenderness.   Musculoskeletal:      Right lower leg: No edema.      Left lower leg: No edema.   Lymphadenopathy:      Cervical: No cervical adenopathy.   Neurological:      Mental Status: She is alert and oriented to person, place, and time.       Office Visit on 08/28/2024   Component Date Value Ref Range Status    Hemoglobin A1C 08/28/2024 5.80 (H)  4.80 - 5.60 % Final    Comment: Hemoglobin A1C Ranges:  Increased Risk for Diabetes  5.7% to 6.4%  Diabetes                     >= 6.5%  Diabetic Goal                 < 7.0%      Glucose 08/28/2024 92  65 - 99 mg/dL Final    BUN 08/28/2024 26 (H)  8 - 23 mg/dL Final    Creatinine 08/28/2024 0.93  0.57 - 1.00 mg/dL Final    EGFR Result 08/28/2024 67.5  >60.0 mL/min/1.73 Final    Comment: GFR Normal >60  Chronic Kidney Disease <60  Kidney Failure <15      BUN/Creatinine Ratio 08/28/2024 28.0 (H)  7.0 - 25.0 Final    Sodium 08/28/2024 139  136 - 145 mmol/L Final    Potassium 08/28/2024 4.5  3.5 - 5.2 mmol/L Final    Chloride 08/28/2024 100  98 - 107 mmol/L Final    Total CO2 08/28/2024 29.5 (H)  22.0 - 29.0 mmol/L Final    Calcium 08/28/2024 10.2  8.6 - 10.5 mg/dL Final    Total Protein 08/28/2024 6.7  6.0 - 8.5 g/dL Final    Albumin 08/28/2024 4.5  3.5 - 5.2 g/dL Final    Globulin 08/28/2024 2.2  gm/dL Final    A/G Ratio 08/28/2024 2.0  g/dL Final    Total Bilirubin 08/28/2024 0.2  0.0 - 1.2 mg/dL Final    Alkaline Phosphatase 08/28/2024 81  39 - 117 U/L Final    AST (SGOT) 08/28/2024 19  1 - 32 U/L Final    ALT (SGPT) 08/28/2024 12  1 - 33 U/L Final    Creatinine, Urine 08/28/2024 70.4  Not Estab. mg/dL Final    Microalbumin, Urine 08/28/2024 5.9  Not Estab. ug/mL Final    Microalbumin/Creatinine Ratio 08/28/2024 8  0 - 29 mg/g creat Final    Comment:                        Normal:                0 -  29                         Moderately increased: 30 - 300                         Severely increased:       >300      25 Hydroxy, Vitamin D 08/28/2024 64.2  30.0 - 100.0 ng/ml Final    Comment: Reference Range for Total Vitamin D 25(OH)  Deficiency <20.0 ng/mL  Insufficiency 21-29 ng/mL  Sufficiency  ng/mL  Toxicity >100 ng/ml      Interpretation 08/28/2024 Note   Final    Comment: -------------------------------  CHRONIC KIDNEY DISEASE:  EGFR, BLOOD PRESSURE, AND PROTEINURIA ASSESSMENT  Estimated GFR is 60 mL/min/1.73mE2 or above; this patient is  not presently in CKD stage 3 or higher, therefore we are  unable to provide suggestions for treatment or  follow-up.  -------------------------------  DISCLAIMER  These assessments and treatment suggestions are provided as  a convenience in support of the physician-patient  relationship and are not intended to replace the physician's  clinical judgment. They are derived from national guidelines  in addition to other evidence and expert opinion. The  clinician should consider this information within the  context of clinical opinion and the individual patient.  SEE GUIDANCE FOR CHRONIC KIDNEY DISEASE PROGRAM: Kidney  Disease Improving Global Outcomes (KDIGO) clinical practice  guidelines are at http://kdigo.org/home/guidelines/.  [employer] Kidney Disease Outcomes Quality  Initiative (KDOQI (TM)), with its                            limitations and  disclaimers, are at www.kidney.org/professionals/KDOQI. This  program is intended for patients who have been diagnosed  with stages 3, 4, or pre-dialysis 5 CKD. It is not intended  for children, pregnant patients, or transplant patients.       Assessment & Plan   Diagnoses and all orders for this visit:    1. Type 2 diabetes mellitus without complication, without long-term current use of insulin (Primary)  -     Comprehensive Metabolic Panel  -     Hemoglobin A1c    2. Hyperlipidemia, unspecified hyperlipidemia type  -     Lipid Panel  -     TSH Rfx On Abnormal To Free T4    3. Statin intolerance    4. Primary hypertension    5. Vitamin D deficiency  -     Vitamin D,25-Hydroxy    6. History of kidney stones    7. Hypercalcemia  -     Comprehensive Metabolic Panel  -     PTH, Intact  -     Calcium, Ionized      Continue Ozempic 2 mg weekly, Jardiance 10 mg/day, and Actos 30 mg a day.    Continue no concentrated sweet low-fat diet.    Check PTH, and vitamin D levels.  Suggest bone density.    Copy of my note sent to Dr. Brock Grayson.    Follow-up in 6 months with RAFI Grayson NP.

## 2024-12-12 LAB
25(OH)D3+25(OH)D2 SERPL-MCNC: 44.9 NG/ML (ref 30–100)
ALBUMIN SERPL-MCNC: 4.1 G/DL (ref 3.9–4.9)
ALP SERPL-CCNC: 95 IU/L (ref 44–121)
ALT SERPL-CCNC: 7 IU/L (ref 0–32)
AST SERPL-CCNC: 16 IU/L (ref 0–40)
BILIRUB SERPL-MCNC: <0.2 MG/DL (ref 0–1.2)
BUN SERPL-MCNC: 20 MG/DL (ref 8–27)
BUN/CREAT SERPL: 22 (ref 12–28)
CA-I SERPL ISE-MCNC: 5 MG/DL (ref 4.5–5.6)
CALCIUM SERPL-MCNC: 10.1 MG/DL (ref 8.7–10.3)
CHLORIDE SERPL-SCNC: 97 MMOL/L (ref 96–106)
CHOLEST SERPL-MCNC: 175 MG/DL (ref 100–199)
CO2 SERPL-SCNC: 26 MMOL/L (ref 20–29)
CREAT SERPL-MCNC: 0.91 MG/DL (ref 0.57–1)
EGFRCR SERPLBLD CKD-EPI 2021: 69 ML/MIN/1.73
GLOBULIN SER CALC-MCNC: 2.5 G/DL (ref 1.5–4.5)
GLUCOSE SERPL-MCNC: 103 MG/DL (ref 70–99)
HBA1C MFR BLD: 6.2 % (ref 4.8–5.6)
HDLC SERPL-MCNC: 56 MG/DL
IMP & REVIEW OF LAB RESULTS: NORMAL
LDLC SERPL CALC-MCNC: 101 MG/DL (ref 0–99)
POTASSIUM SERPL-SCNC: 4.6 MMOL/L (ref 3.5–5.2)
PROT SERPL-MCNC: 6.6 G/DL (ref 6–8.5)
PTH-INTACT SERPL-MCNC: 10 PG/ML (ref 15–65)
SODIUM SERPL-SCNC: 136 MMOL/L (ref 134–144)
TRIGL SERPL-MCNC: 101 MG/DL (ref 0–149)
TSH SERPL DL<=0.005 MIU/L-ACNC: 2.03 UIU/ML (ref 0.45–4.5)
VLDLC SERPL CALC-MCNC: 18 MG/DL (ref 5–40)

## 2024-12-13 NOTE — PROGRESS NOTES
Hemoglobin A1c 6.2%.  Diabetes is well-controlled.  .  HDL 56.  The 10-year ASCVD risk score (Maria Luisa PENNINGTON, et al., 2019) is: 14.2%    Values used to calculate the score:      Age: 69 years      Sex: Female      Is Non- : No      Diabetic: Yes      Tobacco smoker: No      Systolic Blood Pressure: 120 mmHg      Is BP treated: Yes      HDL Cholesterol: 56 mg/dL      Total Cholesterol: 175 mg/dL   10-year risk of heart disease or stroke using the American Heart Association calculator is elevated at 14.2%.  Suggest starting on Nexletol 180 mg 1 tablet daily if okay with patient.  Please let me know patient's response.  Intact PTH 10 pg/mL.  Normal serum calcium at 10.1 mg per DL.  Normal ionized calcium.  Normal 25-hydroxy vitamin D.  Continue vitamin D3 2000 units/day.  Normal thyroid function test.  Copy of my note sent to Dr. Brock Grayson.  Please notify patient of results and instructions.

## 2025-02-07 DIAGNOSIS — E11.22 TYPE 2 DIABETES MELLITUS WITH STAGE 3A CHRONIC KIDNEY DISEASE, WITHOUT LONG-TERM CURRENT USE OF INSULIN: ICD-10-CM

## 2025-02-07 DIAGNOSIS — N18.31 TYPE 2 DIABETES MELLITUS WITH STAGE 3A CHRONIC KIDNEY DISEASE, WITHOUT LONG-TERM CURRENT USE OF INSULIN: ICD-10-CM

## 2025-02-07 RX ORDER — EMPAGLIFLOZIN 10 MG/1
10 TABLET, FILM COATED ORAL EVERY MORNING
Qty: 90 TABLET | Refills: 2 | Status: SHIPPED | OUTPATIENT
Start: 2025-02-07

## 2025-02-07 RX ORDER — PIOGLITAZONE 30 MG/1
30 TABLET ORAL DAILY
Qty: 90 TABLET | Refills: 2 | Status: SHIPPED | OUTPATIENT
Start: 2025-02-07

## 2025-02-07 NOTE — TELEPHONE ENCOUNTER
Rx Refill Note  Requested Prescriptions     Pending Prescriptions Disp Refills    pioglitazone (ACTOS) 30 MG tablet [Pharmacy Med Name: PIOGLITAZONE HCL 30 MG TABLET] 90 tablet 1     Sig: TAKE 1 TABLET BY MOUTH EVERY DAY      Last office visit with prescribing clinician: 8/28/2024   Last telemedicine visit with prescribing clinician: Visit date not found   Next office visit with prescribing clinician: 6/12/2025                         Would you like a call back once the refill request has been completed: [] Yes [] No    If the office needs to give you a call back, can they leave a voicemail: [] Yes [] No    Blanca Nieves MA  02/07/25, 09:20 EST

## 2025-02-07 NOTE — TELEPHONE ENCOUNTER
Rx Refill Note  Requested Prescriptions     Pending Prescriptions Disp Refills    Jardiance 10 MG tablet tablet [Pharmacy Med Name: JARDIANCE 10 MG TABLET] 90 tablet 1     Sig: TAKE 1 TABLET BY MOUTH EVERY DAY IN THE MORNING      Last office visit with prescribing clinician: Visit date not found   Last telemedicine visit with prescribing clinician: Visit date not found   Next office visit with prescribing clinician: Visit date not found                         Would you like a call back once the refill request has been completed: [] Yes [] No    If the office needs to give you a call back, can they leave a voicemail: [] Yes [] No    Blanca Nieves MA  02/07/25, 09:20 EST

## 2025-04-08 DIAGNOSIS — N18.31 TYPE 2 DIABETES MELLITUS WITH STAGE 3A CHRONIC KIDNEY DISEASE, WITHOUT LONG-TERM CURRENT USE OF INSULIN: ICD-10-CM

## 2025-04-08 DIAGNOSIS — E11.22 TYPE 2 DIABETES MELLITUS WITH STAGE 3A CHRONIC KIDNEY DISEASE, WITHOUT LONG-TERM CURRENT USE OF INSULIN: ICD-10-CM

## 2025-04-08 RX ORDER — SEMAGLUTIDE 2.68 MG/ML
INJECTION, SOLUTION SUBCUTANEOUS
Qty: 9 ML | Refills: 1 | Status: SHIPPED | OUTPATIENT
Start: 2025-04-08

## 2025-04-08 NOTE — TELEPHONE ENCOUNTER
Rx Refill Note  Requested Prescriptions     Pending Prescriptions Disp Refills    Ozempic, 2 MG/DOSE, 8 MG/3ML solution pen-injector [Pharmacy Med Name: Ozempic (2 MG/DOSE) 8 MG/3ML Subcutaneous Solution Pen-injector] 9 mL 0     Sig: INJECT 2 MG UNDER THE SKIN INTO THE APPROPRIATE AREA AS DIRECTED ONCE WEEKLY FOR TYPE 2 DIABETES      Last office visit with prescribing clinician: 8/28/2024   Last telemedicine visit with prescribing clinician: Visit date not found   Next office visit with prescribing clinician: 6/12/2025                         Would you like a call back once the refill request has been completed: [] Yes [] No    If the office needs to give you a call back, can they leave a voicemail: [] Yes [] No    Evangelina Campos  04/08/25, 07:50 EDT

## 2025-06-12 ENCOUNTER — OFFICE VISIT (OUTPATIENT)
Dept: ENDOCRINOLOGY | Age: 70
End: 2025-06-12
Payer: COMMERCIAL

## 2025-06-12 VITALS
TEMPERATURE: 98 F | DIASTOLIC BLOOD PRESSURE: 72 MMHG | OXYGEN SATURATION: 94 % | WEIGHT: 107.6 LBS | SYSTOLIC BLOOD PRESSURE: 114 MMHG | BODY MASS INDEX: 19.07 KG/M2 | HEART RATE: 76 BPM | HEIGHT: 63 IN

## 2025-06-12 DIAGNOSIS — E11.22 TYPE 2 DIABETES MELLITUS WITH STAGE 3A CHRONIC KIDNEY DISEASE, WITHOUT LONG-TERM CURRENT USE OF INSULIN: Primary | ICD-10-CM

## 2025-06-12 DIAGNOSIS — I12.9 HYPERTENSIVE KIDNEY DISEASE WITH STAGE 3A CHRONIC KIDNEY DISEASE: ICD-10-CM

## 2025-06-12 DIAGNOSIS — N18.31 HYPERTENSIVE KIDNEY DISEASE WITH STAGE 3A CHRONIC KIDNEY DISEASE: ICD-10-CM

## 2025-06-12 DIAGNOSIS — N18.31 TYPE 2 DIABETES MELLITUS WITH STAGE 3A CHRONIC KIDNEY DISEASE, WITHOUT LONG-TERM CURRENT USE OF INSULIN: Primary | ICD-10-CM

## 2025-06-12 DIAGNOSIS — E21.3 HYPERPARATHYROIDISM: ICD-10-CM

## 2025-06-12 DIAGNOSIS — E55.9 VITAMIN D DEFICIENCY: ICD-10-CM

## 2025-06-12 PROCEDURE — 99214 OFFICE O/P EST MOD 30 MIN: CPT | Performed by: NURSE PRACTITIONER

## 2025-06-12 RX ORDER — CEPHALEXIN 500 MG/1
500 CAPSULE ORAL 4 TIMES DAILY
COMMUNITY
Start: 2025-06-03

## 2025-06-12 NOTE — PROGRESS NOTES
Chief Complaint  Chief Complaint   Patient presents with    Diabetes     Type 2 : Pt doesn't have meter today, doesn't check bs regularly, is up to date on eye exam, no hx of retinopathy or neuropathy.       Subjective        History of Present Illness    Paz Perera 70 y.o. presents for a follow-up for Type 2 Diabetes     She was diagnosed with diabetes in 2017     Pt is on the following medications for their diabetes: Jardiance 10 mg daily, pioglitazone 30 mg daily and Ozempic 2 mg weekly        Jardiance was decreased from 25 mg to 10 mg to help with vaginal yeast infections   Januvia stopped once GLP1 started  Metformin caused diarrhea and upset stomach           Denies nausea, diarrhea, constipation, chest pain, shortness of breath, vision changes or numbness and tingling in feet/hands.    Pt denies a history of diabetic retinopathy.  Last eye exam was 2024     Pt does have nephropathy, CKD Stage 3a.  Patient is currently taking ARB      Pt denies diabetic neuropathy.     Pt denies a history of CAD or CVA.    Last A1C in 12/24 was 6.2    Last microalbumin in 08/24 was negative            Blood Sugars    Blood glucoses are not checked              Hypertension with CKD stage 3a     Pt denies any chest pain, palpitations, shortness of breath, or headache     Current regimen includes olmesartan 20 mg daily  She is on spironolactone 25 mg daily for acne prescribed by her PCP.                     Vitamin D Deficiency     Current treatment includes nothing - taken off due to high levels     Last Vit D level was 44.9 in 12/24                  Hyperparathyroidism     Calcium has been elevated on and off throughout the years.  Doesn't take a supplement and itsn't on thiazide.    Vitamin D levels are normal.  PTH was normal at 25, as was ionized calcium at 5.5 in 03/22.      24 hour urine confirmed hyperparathyroidism     Last calcium in 12/24 was 10.1  Last PTH in 12/24 was 10     Never got parathyroid scan that was  "ordered     Never got DEXA scan               Other History    Pt has hyperlipidemia  Pt intolerant to statins, Zetia and Repatha  Pt refuses any/all cholesterol medications              Objective   Vital Signs:   /72   Pulse 76   Temp 98 °F (36.7 °C) (Oral)   Ht 160 cm (62.99\")   Wt 48.8 kg (107 lb 9.6 oz)   SpO2 94%   BMI 19.07 kg/m²       Physical Exam   Physical Exam  Constitutional:       General: She is not in acute distress.     Appearance: Normal appearance. She is not diaphoretic.   HENT:      Head: Normocephalic and atraumatic.   Eyes:      General:         Right eye: No discharge.         Left eye: No discharge.   Skin:     General: Skin is warm and dry.   Neurological:      Mental Status: She is alert.   Psychiatric:         Mood and Affect: Mood normal.         Behavior: Behavior normal.                    Results Review:   Hemoglobin A1C   Date Value Ref Range Status   12/11/2024 6.2 (H) 4.8 - 5.6 % Final     Comment:              Prediabetes: 5.7 - 6.4           Diabetes: >6.4           Glycemic control for adults with diabetes: <7.0     08/25/2021 7.70 (H) 4.80 - 5.60 % Final     Total Cholesterol   Date Value Ref Range Status   08/25/2021 257 (H) 0 - 200 mg/dL Final     Triglycerides   Date Value Ref Range Status   12/11/2024 101 0 - 149 mg/dL Final   08/25/2021 156 (H) 0 - 150 mg/dL Final     HDL Cholesterol   Date Value Ref Range Status   12/11/2024 56 >39 mg/dL Final   08/25/2021 60 40 - 60 mg/dL Final     LDL Chol Calc (NIH)   Date Value Ref Range Status   12/11/2024 101 (H) 0 - 99 mg/dL Final     VLDL Cholesterol Manoj   Date Value Ref Range Status   12/11/2024 18 5 - 40 mg/dL Final     LDL/HDL Ratio   Date Value Ref Range Status   08/25/2021 2.76  Final         Assessment and Plan  Diagnoses and all orders for this visit:    1. Type 2 diabetes mellitus with stage 3a chronic kidney disease, without long-term current use of insulin (Primary)  -     Basic Metabolic Panel  -     " Hemoglobin A1c  -     Microalbumin / Creatinine Urine Ratio - Urine, Clean Catch    Check labs today  Continue with Jardiance 10 mg daily  Continue with Actos 30 mg daily   Continue with Ozempic 2 mg weekly      2. Hypertensive kidney disease with stage 3a chronic kidney disease  -     Basic Metabolic Panel    Stable  Continue with current medication regimen  Defer management to PCP       3. Hyperparathyroidism  -     Basic Metabolic Panel    Calcium has been within normal range  Check labs today  Will continue to monitor      4. Vitamin D deficiency  -     Basic Metabolic Panel    Off supplement  Last check showed normal levels  Will continue to monitor       No refills needed at this time      Labs today  RTC in 6 months with Dr. Martinez      Follow Up    Patient was given instructions and counseling regarding her condition or for health maintenance advice. Please see specific information pulled into the AVS if appropriate.                LUIS Rojas  06/12/25

## 2025-06-13 ENCOUNTER — RESULTS FOLLOW-UP (OUTPATIENT)
Dept: ENDOCRINOLOGY | Age: 70
End: 2025-06-13
Payer: COMMERCIAL

## 2025-06-13 LAB
ALBUMIN/CREAT UR: 6 MG/G CREAT (ref 0–29)
BUN SERPL-MCNC: 18 MG/DL (ref 8–23)
BUN/CREAT SERPL: 18.9 (ref 7–25)
CALCIUM SERPL-MCNC: 10.4 MG/DL (ref 8.6–10.5)
CHLORIDE SERPL-SCNC: 100 MMOL/L (ref 98–107)
CO2 SERPL-SCNC: 27.9 MMOL/L (ref 22–29)
CREAT SERPL-MCNC: 0.95 MG/DL (ref 0.57–1)
CREAT UR-MCNC: 79.7 MG/DL
EGFRCR SERPLBLD CKD-EPI 2021: 65.4 ML/MIN/1.73
GLUCOSE SERPL-MCNC: 97 MG/DL (ref 65–99)
HBA1C MFR BLD: 5.6 % (ref 4.8–5.6)
MICROALBUMIN UR-MCNC: 4.9 UG/ML
POTASSIUM SERPL-SCNC: 4.5 MMOL/L (ref 3.5–5.2)
REPORT: NORMAL
SODIUM SERPL-SCNC: 140 MMOL/L (ref 136–145)